# Patient Record
Sex: MALE | Race: WHITE | NOT HISPANIC OR LATINO | Employment: OTHER | URBAN - METROPOLITAN AREA
[De-identification: names, ages, dates, MRNs, and addresses within clinical notes are randomized per-mention and may not be internally consistent; named-entity substitution may affect disease eponyms.]

---

## 2018-01-11 ENCOUNTER — ALLSCRIPTS OFFICE VISIT (OUTPATIENT)
Dept: OTHER | Facility: OTHER | Age: 83
End: 2018-01-11

## 2018-01-11 LAB
BILIRUB UR QL STRIP: NORMAL
CLARITY UR: NORMAL
COLOR UR: YELLOW
GLUCOSE (HISTORICAL): NORMAL
HGB UR QL STRIP.AUTO: NORMAL
KETONES UR STRIP-MCNC: NORMAL MG/DL
LEUKOCYTE ESTERASE UR QL STRIP: NORMAL
NITRITE UR QL STRIP: NORMAL
PH UR STRIP.AUTO: 7.5 [PH]
PROT UR STRIP-MCNC: NORMAL MG/DL
SP GR UR STRIP.AUTO: 1.01
UROBILINOGEN UR QL STRIP.AUTO: 0.2

## 2018-01-22 VITALS
DIASTOLIC BLOOD PRESSURE: 70 MMHG | WEIGHT: 186 LBS | HEIGHT: 69 IN | SYSTOLIC BLOOD PRESSURE: 130 MMHG | BODY MASS INDEX: 27.55 KG/M2

## 2018-08-07 DIAGNOSIS — N13.9 BENIGN LOCALIZED HYPERPLASIA OF PROSTATE WITH URINARY OBSTRUCTION AND LOWER URINARY TRACT SYMPTOMS: Primary | ICD-10-CM

## 2018-08-07 DIAGNOSIS — N40.1 BENIGN LOCALIZED HYPERPLASIA OF PROSTATE WITH URINARY OBSTRUCTION AND LOWER URINARY TRACT SYMPTOMS: Primary | ICD-10-CM

## 2018-08-07 RX ORDER — TAMSULOSIN HYDROCHLORIDE 0.4 MG/1
0.4 CAPSULE ORAL
Qty: 90 CAPSULE | Refills: 1 | Status: SHIPPED | OUTPATIENT
Start: 2018-08-07 | End: 2019-01-10 | Stop reason: SDUPTHER

## 2018-08-07 NOTE — TELEPHONE ENCOUNTER
An Auto-fax Refill Request for Tamsulosin was received from [facility]  Patient was last seen in January, 2018 by YAMILA Larkin; continuation of the medication was approved at that time    Script for same, 90 day supply with 1 refills was queued and forwarded to Dr Giancarlo Wong for approval

## 2019-01-08 RX ORDER — FUROSEMIDE 40 MG/1
40 TABLET ORAL
COMMUNITY

## 2019-01-08 RX ORDER — POTASSIUM CHLORIDE 20 MEQ/1
20 TABLET, EXTENDED RELEASE ORAL
COMMUNITY

## 2019-01-08 RX ORDER — FAMOTIDINE 40 MG/1
40 TABLET, FILM COATED ORAL
COMMUNITY

## 2019-01-08 RX ORDER — TAMSULOSIN HYDROCHLORIDE 0.4 MG/1
1 CAPSULE ORAL
COMMUNITY
Start: 2018-01-11 | End: 2019-01-10 | Stop reason: SDUPTHER

## 2019-01-08 RX ORDER — SIMVASTATIN 40 MG
40 TABLET ORAL
COMMUNITY

## 2019-01-10 ENCOUNTER — OFFICE VISIT (OUTPATIENT)
Dept: UROLOGY | Facility: MEDICAL CENTER | Age: 84
End: 2019-01-10
Payer: MEDICARE

## 2019-01-10 VITALS
BODY MASS INDEX: 24.62 KG/M2 | HEIGHT: 70 IN | WEIGHT: 172 LBS | HEART RATE: 75 BPM | SYSTOLIC BLOOD PRESSURE: 120 MMHG | DIASTOLIC BLOOD PRESSURE: 60 MMHG

## 2019-01-10 DIAGNOSIS — N13.9 BENIGN LOCALIZED HYPERPLASIA OF PROSTATE WITH URINARY OBSTRUCTION AND LOWER URINARY TRACT SYMPTOMS: Primary | ICD-10-CM

## 2019-01-10 DIAGNOSIS — N40.1 BENIGN LOCALIZED HYPERPLASIA OF PROSTATE WITH URINARY OBSTRUCTION AND LOWER URINARY TRACT SYMPTOMS: Primary | ICD-10-CM

## 2019-01-10 LAB
SL AMB  POCT GLUCOSE, UA: NORMAL
SL AMB LEUKOCYTE ESTERASE,UA: NORMAL
SL AMB POCT BILIRUBIN,UA: NORMAL
SL AMB POCT BLOOD,UA: NORMAL
SL AMB POCT CLARITY,UA: CLEAR
SL AMB POCT COLOR,UA: YELLOW
SL AMB POCT KETONES,UA: NORMAL
SL AMB POCT NITRITE,UA: NORMAL
SL AMB POCT PH,UA: 7
SL AMB POCT SPECIFIC GRAVITY,UA: 1.02
SL AMB POCT URINE PROTEIN: NORMAL
SL AMB POCT UROBILINOGEN: 0.2

## 2019-01-10 PROCEDURE — 81003 URINALYSIS AUTO W/O SCOPE: CPT | Performed by: UROLOGY

## 2019-01-10 PROCEDURE — 99214 OFFICE O/P EST MOD 30 MIN: CPT | Performed by: UROLOGY

## 2019-01-10 RX ORDER — LOSARTAN POTASSIUM 100 MG/1
100 TABLET ORAL DAILY
COMMUNITY

## 2019-01-10 RX ORDER — TAMSULOSIN HYDROCHLORIDE 0.4 MG/1
0.4 CAPSULE ORAL
Qty: 30 CAPSULE | Refills: 11 | Status: SHIPPED | OUTPATIENT
Start: 2019-01-10 | End: 2020-01-08 | Stop reason: SDUPTHER

## 2019-01-10 RX ORDER — TAMSULOSIN HYDROCHLORIDE 0.4 MG/1
0.4 CAPSULE ORAL
Qty: 90 CAPSULE | Refills: 3 | Status: SHIPPED | OUTPATIENT
Start: 2019-01-10 | End: 2019-01-10 | Stop reason: SDUPTHER

## 2019-01-10 NOTE — PROGRESS NOTES
[address] [facility]  [facility]  [address] [facility]  [place], [address] [facility]  [phone]  www  [facility]  org      NAME: Luli Parks  AGE: 81 y o  SEX: male  : 1935   MRN: [id number]    DATE: 1/10/2019  TIME: 1:45 PM    Assessment and Plan:    BPH with obstruction- continue with flomax  Large left hydrocele: This does not bother him, so does not warrant treatment  Encounter for prostate cancer screening:  Normal rectal exam, PSA was stable for him back in April  We can check his PSA every couple of years  Follow-up 1 year  Chief Complaint   No chief complaint on file  History of Present Illness   81-year-old man for follow-up of BPH with obstruction, and history of nodular prostate status post prostate needle biopsy in   He continues on Flomax  CT scan 2018 showed normal urinary tract  No new complaints  PSA 2 59 2018, basically stable  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[employer]       The following portions of the patient's history were reviewed and updated as appropriate: allergies, current medications, past family history, past medical history, past social history, past surgical history and problem list     Review of Systems   Review of Systems   Genitourinary: Negative  Active Problem List   There is no problem list on file for this patient  Objective   /60   Pulse 75   Ht 5' 10" (1 778 m)   Wt 78 kg (172 lb)   BMI 24 68 kg/m²     Physical Exam   Constitutional: He is oriented to person, place, and time  He appears well-developed and well-nourished  HENT:   Head: Normocephalic and atraumatic  Eyes: EOM are normal    Neck: Normal range of motion  Pulmonary/Chest: Effort normal    Abdominal: Soft  He exhibits no distension  There is no tenderness  There is no rebound and no guarding     Genitourinary: Penis normal    Genitourinary Comments: Rectal exam reveals normal tone, no masses and his prostate is about 50 g, smooth symmetric and benign  No nodules  His testicles are descended bilaterally but he has a very large left hydrocele  No inguinal hernia  Musculoskeletal: Normal range of motion  Neurological: He is alert and oriented to person, place, and time  Skin: Skin is warm and dry  Psychiatric: He has a normal mood and affect   His behavior is normal  Judgment and thought content normal            Current Medications     Current Outpatient Prescriptions:     Cholecalciferol (CVS VITAMIN D3) 1000 units capsule, Take by mouth, Disp: , Rfl:     famotidine (PEPCID) 40 MG tablet, Take 40 mg by mouth, Disp: , Rfl:     furosemide (LASIX) 40 mg tablet, Take 40 mg by mouth, Disp: , Rfl:     losartan (COZAAR) 100 MG tablet, Take 100 mg by mouth daily, Disp: , Rfl:     potassium chloride (K-DUR,KLOR-CON) 20 mEq tablet, Take 20 mEq by mouth, Disp: , Rfl:     simvastatin (ZOCOR) 40 mg tablet, Take 40 mg by mouth, Disp: , Rfl:     tamsulosin (FLOMAX) 0 4 mg, Take 1 capsule (0 4 mg total) by mouth daily with dinner, Disp: 90 capsule, Rfl: 1    fluticasone (VERAMYST) 27 5 MCG/SPRAY nasal spray, into each nostril, Disp: , Rfl:         Jaime Lombardo MD

## 2019-01-10 NOTE — LETTER
January 10, 2019     Felicitas Deutsch MD  [address] [address] [employer]    Patient: Sheila Fajardo   YOB: 1935   Date of Visit: 1/10/2019       Dear Dr Summer Medina: Thank you for referring Tal Colindres to me for evaluation  Below are my notes for this consultation  If you have questions, please do not hesitate to call me  I look forward to following your patient along with you  Sincerely,        Sherryle Alice, MD        CC: No Recipients  Sherryle Alice, MD  1/10/2019  2:00 PM  Sign at close encounter  [address] [facility]  [address], [address] [place] [address]  [phone]  www  [facility]  org      NAME: Sheila Fajardo  AGE: 81 y o  SEX: male  : 1935   MRN: [id number]    DATE: 1/10/2019  TIME: 1:45 PM    Assessment and Plan:    BPH with obstruction- continue with flomax  Large left hydrocele: This does not bother him, so does not warrant treatment  Encounter for prostate cancer screening:  Normal rectal exam, PSA was stable for him back in April  We can check his PSA every couple of years  Follow-up 1 year  Chief Complaint   No chief complaint on file  History of Present Illness   81-year-old man for follow-up of BPH with obstruction, and history of nodular prostate status post prostate needle biopsy in   He continues on Flomax  CT scan 2018 showed normal urinary tract  No new complaints  PSA 2 59 2018, basically stable  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[employer]       The following portions of the patient's history were reviewed and updated as appropriate: allergies, current medications, past family history, past medical history, past social history, past surgical history and problem list     Review of Systems   Review of Systems   Genitourinary: Negative          Active Problem List   There is no problem list on file for this patient  Objective   /60   Pulse 75   Ht 5' 10" (1 778 m)   Wt 78 kg (172 lb)   BMI 24 68 kg/m²      Physical Exam   Constitutional: He is oriented to person, place, and time  He appears well-developed and well-nourished  HENT:   Head: Normocephalic and atraumatic  Eyes: EOM are normal    Neck: Normal range of motion  Pulmonary/Chest: Effort normal    Abdominal: Soft  He exhibits no distension  There is no tenderness  There is no rebound and no guarding  Genitourinary: Penis normal    Genitourinary Comments: Rectal exam reveals normal tone, no masses and his prostate is about 50 g, smooth symmetric and benign  No nodules  His testicles are descended bilaterally but he has a very large left hydrocele  No inguinal hernia  Musculoskeletal: Normal range of motion  Neurological: He is alert and oriented to person, place, and time  Skin: Skin is warm and dry  Psychiatric: He has a normal mood and affect   His behavior is normal  Judgment and thought content normal            Current Medications     Current Outpatient Prescriptions:     Cholecalciferol (CVS VITAMIN D3) 1000 units capsule, Take by mouth, Disp: , Rfl:     famotidine (PEPCID) 40 MG tablet, Take 40 mg by mouth, Disp: , Rfl:     furosemide (LASIX) 40 mg tablet, Take 40 mg by mouth, Disp: , Rfl:     losartan (COZAAR) 100 MG tablet, Take 100 mg by mouth daily, Disp: , Rfl:     potassium chloride (K-DUR,KLOR-CON) 20 mEq tablet, Take 20 mEq by mouth, Disp: , Rfl:     simvastatin (ZOCOR) 40 mg tablet, Take 40 mg by mouth, Disp: , Rfl:     tamsulosin (FLOMAX) 0 4 mg, Take 1 capsule (0 4 mg total) by mouth daily with dinner, Disp: 90 capsule, Rfl: 1    fluticasone (VERAMYST) 27 5 MCG/SPRAY nasal spray, into each nostril, Disp: , Rfl:         Sherryle Alice, MD

## 2019-03-20 ENCOUNTER — HOSPITAL ENCOUNTER (EMERGENCY)
Facility: HOSPITAL | Age: 84
Discharge: HOME | End: 2019-03-20
Attending: EMERGENCY MEDICINE | Admitting: EMERGENCY MEDICINE
Payer: MEDICARE

## 2019-03-20 ENCOUNTER — APPOINTMENT (EMERGENCY)
Dept: CT IMAGING | Facility: HOSPITAL | Age: 84
End: 2019-03-20
Payer: MEDICARE

## 2019-03-20 VITALS
RESPIRATION RATE: 16 BRPM | OXYGEN SATURATION: 97 % | SYSTOLIC BLOOD PRESSURE: 170 MMHG | HEART RATE: 76 BPM | DIASTOLIC BLOOD PRESSURE: 77 MMHG | TEMPERATURE: 98.2 F

## 2019-03-20 DIAGNOSIS — S09.90XA INJURY OF HEAD, INITIAL ENCOUNTER: Primary | ICD-10-CM
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
## 2019-03-20 DIAGNOSIS — S00.83XA TRAUMATIC HEMATOMA OF FOREHEAD, INITIAL ENCOUNTER: ICD-10-CM

## 2019-03-20 PROCEDURE — 70450 CT HEAD/BRAIN W/O DYE: CPT

## 2019-03-20 PROCEDURE — 99284 EMERGENCY DEPT VISIT MOD MDM: CPT

## 2019-03-20 NOTE — DISCHARGE INSTRUCTIONS
DISCHARGE INSTRUCTIONS:    FOLLOW UP WITH YOUR PRIMARY CARE PROVIDER OR THE [address] [place] [address]  MAKE AN APPOINTMENT TO BE SEEN  TAKE TYLENOL FOR PAIN  REST AND ICE THE AREA  IF SYMPTOMS WORSEN OR NEW SYMPTOMS ARISE, RETURN TO THE ER TO BE SEEN

## 2019-03-20 NOTE — ED PROVIDER NOTES
History  Chief Complaint   Patient presents with    Head Injury     Patient tripped on the last step walking into the [employer], fell and struck his right forehead off of the [employer] floor; (-) LOC/thinners; AAOx4      83y  o male with PMH of BPH, gout, GERD, HLD and HTN presents to the ER for evaluation after a fall occurring 1 hour prior to arrival  Patient states he was at [employer] going up steep steps when he tripped over the last step  He did hit his head but denies LOC  He denies blood thinner use  He applied ice to his head  He describes his pain as aching and non-radiating  Pain comes and goes  He denies fever, chills, vision changes, chest pain, dyspnea, N/V/D, abdominal pain, neck pain, back pain, weakness or paresthesias  History provided by:  Patient   used: No        Prior to Admission Medications   Prescriptions Last Dose Informant Patient Reported? Taking?    Cholecalciferol (CVS VITAMIN D3) 1000 units capsule  Self Yes No   Sig: Take by mouth   famotidine (PEPCID) 40 MG tablet  Self Yes No   Sig: Take 40 mg by mouth   fluticasone (VERAMYST) 27 5 MCG/SPRAY nasal spray  Self Yes No   Sig: into each nostril   furosemide (LASIX) 40 mg tablet  Self Yes No   Sig: Take 40 mg by mouth   losartan (COZAAR) 100 MG tablet  Self Yes No   Sig: Take 100 mg by mouth daily   potassium chloride (K-DUR,KLOR-CON) 20 mEq tablet  Self Yes No   Sig: Take 20 mEq by mouth   simvastatin (ZOCOR) 40 mg tablet  Self Yes No   Sig: Take 40 mg by mouth   tamsulosin (FLOMAX) 0 4 mg   No No   Sig: Take 1 capsule (0 4 mg total) by mouth daily with dinner      Facility-Administered Medications: None       Past Medical History:   Diagnosis Date    Allergic rhinitis     BPH with obstruction/lower urinary tract symptoms 2016    Eye disorder     Frequency of urination 2015    Gout     H/O gastroesophageal reflux (GERD)     Hypercholesteremia     Hypertension     Incomplete bladder emptying 2016    UTI (urinary tract infection) 2015       Past Surgical History:   Procedure Laterality Date    CATARACT EXTRACTION, BILATERAL      COLONOSCOPY  2002    TONSILLECTOMY  1942       Family History   Problem Relation Age of Onset    Heart failure Mother     Heart attack Father     Hypertension Father      I have reviewed and agree with the history as documented  Social History     Tobacco Use    Smoking status: Never Smoker    Smokeless tobacco: Never Used   Substance Use Topics    Alcohol use: Yes     Alcohol/week: 1 2 oz     Types: 2 Glasses of wine per week    Drug use: No        Review of Systems   Constitutional: Negative for chills and fever  Eyes: Negative for redness  Respiratory: Negative for shortness of breath  Cardiovascular: Negative for chest pain  Gastrointestinal: Negative for abdominal pain, diarrhea, nausea and vomiting  Musculoskeletal: Negative for back pain, neck pain and neck stiffness  Skin: Negative for rash  Allergic/Immunologic: Negative for food allergies  Neurological: Negative for weakness and numbness  Physical Exam  Physical Exam   Constitutional:  Non-toxic appearance  No distress  HENT:   Head: Normocephalic  Head is with contusion  Head is without raccoon's eyes, without Olson's sign and without laceration  Right Ear: Tympanic membrane, external ear and ear canal normal  No drainage, swelling or tenderness  No foreign bodies  Tympanic membrane is not erythematous  No hemotympanum  Left Ear: Tympanic membrane, external ear and ear canal normal  No drainage, swelling or tenderness  No foreign bodies  Tympanic membrane is not erythematous  No hemotympanum  Nose: Nose normal    Mouth/Throat: Uvula is midline, oropharynx is clear and moist and mucous membranes are normal  No uvula swelling  No posterior oropharyngeal edema, posterior oropharyngeal erythema or tonsillar abscesses  No tonsillar exudate  Eyes: Pupils are equal, round, and reactive to light  Conjunctivae and EOM are normal    Neck: Normal range of motion and phonation normal  Neck supple  No tracheal deviation present  Cardiovascular: Normal rate, regular rhythm, S1 normal, S2 normal and normal heart sounds  Exam reveals no gallop and no friction rub  No murmur heard  Pulmonary/Chest: Effort normal and breath sounds normal  No respiratory distress  He has no decreased breath sounds  He has no wheezes  He has no rhonchi  He has no rales  He exhibits no tenderness  Abdominal: Soft  Bowel sounds are normal  He exhibits no distension  There is no tenderness  There is no rebound and no guarding  Musculoskeletal: Normal range of motion  He exhibits no edema or deformity  Cervical back: Normal         Thoracic back: Normal         Lumbar back: Normal         Right hand: He exhibits laceration (abrasions seen)  He exhibits normal range of motion, no tenderness, no bony tenderness, normal capillary refill, no deformity and no swelling  Normal sensation noted  Normal strength noted  Hands:  Neurological: He is alert  He has normal strength  No cranial nerve deficit or sensory deficit  He exhibits normal muscle tone  Gait normal  GCS eye subscore is 4  GCS verbal subscore is 5  GCS motor subscore is 6  Skin: Skin is warm and dry  No rash noted  Psychiatric: He has a normal mood and affect  Nursing note and vitals reviewed        Vital Signs  ED Triage Vitals [03/20/19 1149]   Temperature Pulse Respirations Blood Pressure SpO2   98 4 °F (36 9 °C) 89 18 (!) 174/77 99 %      Temp Source Heart Rate Source Patient Position - Orthostatic VS BP Location FiO2 (%)   Oral Monitor Lying Right arm --      Pain Score       7           Vitals:    03/20/19 1149   BP: (!) 174/77   Pulse: 89   Patient Position - Orthostatic VS: Lying         Visual Acuity  Visual Acuity      Most Recent Value   L Pupil Size (mm)  2   R Pupil Size (mm)  2          ED Medications  Medications - No data to display    Diagnostic Studies  Results Reviewed     None                 CT head without contrast   Final Result by Darby Pickett DO (03/20 1228)   No acute intracranial abnormality  Small right forehead scalp hematoma without underlying fracture  Workstation performed: [id number]                    Procedures  Procedures       Phone Contacts  ED Phone Contact    ED Course                               MDM  Number of Diagnoses or Management Options  Fall, initial encounter: new and requires workup  Injury of head, initial encounter: new and requires workup  Traumatic hematoma of forehead, initial encounter: new and requires workup  Diagnosis management comments: DDX consists of but not limited to: fracture, contusion    Will CT head  At discharge, I instructed the patient to:  -follow up with pcp  -take Tylenol for pain  -rest and apply ice to the area  -return to the ER if symptoms worsened or new symptoms arose  Patient agreed to this plan and was stable at time of discharge  Amount and/or Complexity of Data Reviewed  Tests in the radiology section of CPT®: ordered and reviewed  Independent visualization of images, tracings, or specimens: yes    Patient Progress  Patient progress: stable      Disposition  Final diagnoses:   Injury of head, initial encounter   Fall, initial encounter   Traumatic hematoma of forehead, initial encounter     Time reflects when diagnosis was documented in both MDM as applicable and the Disposition within this note     Time User Action Codes Description Comment    3/20/2019  1:06 PM Karla Mccabe Add [S09 90XA] Injury of head, initial encounter     3/20/2019  1:06 PM Karla Mccabe Add [X44  MWII] Fall, initial encounter     3/20/2019  1:07 PM Celena VELASQUEZ Add [S00 83XA] Traumatic hematoma of forehead, initial encounter       ED Disposition     ED Disposition Condition Date/Time Comment    Discharge Stable Wed Mar 20, 2019  1:06 PM Arron Allen Carolyn Mandujano discharge to home/self care  Follow-up Information     Follow up With Specialties Details Why Contact Info    Nirali Lott MD Geriatric Medicine Schedule an appointment as soon as possible for a visit  As needed [address]            Patient's Medications   Discharge Prescriptions    No medications on file     No discharge procedures on file      ED Provider  Electronically Signed by           Yola Moreno PA-C  03/20/19 1101

## 2019-03-20 NOTE — ED NOTES
Kendrick Ibarra,  from [facility], to bring patient's car for him and pick him up        Brice Parra, RN  03/20/19 1327

## 2019-03-20 NOTE — ED NOTES
Patient transported to [address] [place] [address], [address] [facility]  03/20/19 1212

## 2020-01-07 DIAGNOSIS — N40.1 BENIGN LOCALIZED HYPERPLASIA OF PROSTATE WITH URINARY OBSTRUCTION AND LOWER URINARY TRACT SYMPTOMS: ICD-10-CM

## 2020-01-07 DIAGNOSIS — N13.9 BENIGN LOCALIZED HYPERPLASIA OF PROSTATE WITH URINARY OBSTRUCTION AND LOWER URINARY TRACT SYMPTOMS: ICD-10-CM

## 2020-01-08 NOTE — TELEPHONE ENCOUNTER
The patient has an upcoming office visit scheduled for 2/11/20 with Dr Qiana Pastor in the [facility] location but will run out of medication until then    Request for same, 90 day supply with NO refills was queued and forwarded to the Advanced Practitioner covering the [facility] location for approval

## 2020-01-09 RX ORDER — TAMSULOSIN HYDROCHLORIDE 0.4 MG/1
0.4 CAPSULE ORAL
Qty: 90 CAPSULE | Refills: 0 | Status: SHIPPED | OUTPATIENT
Start: 2020-01-09 | End: 2020-04-02 | Stop reason: SDUPTHER

## 2020-02-11 ENCOUNTER — OFFICE VISIT (OUTPATIENT)
Dept: UROLOGY | Facility: MEDICAL CENTER | Age: 85
End: 2020-02-11
Payer: MEDICARE

## 2020-02-11 VITALS
WEIGHT: 181 LBS | SYSTOLIC BLOOD PRESSURE: 140 MMHG | HEIGHT: 70 IN | DIASTOLIC BLOOD PRESSURE: 70 MMHG | HEART RATE: 80 BPM | BODY MASS INDEX: 25.91 KG/M2

## 2020-02-11 DIAGNOSIS — N13.9 BENIGN LOCALIZED HYPERPLASIA OF PROSTATE WITH URINARY OBSTRUCTION AND LOWER URINARY TRACT SYMPTOMS: Primary | ICD-10-CM

## 2020-02-11 DIAGNOSIS — Z12.5 ENCOUNTER FOR PROSTATE CANCER SCREENING: ICD-10-CM

## 2020-02-11 DIAGNOSIS — N40.1 BENIGN LOCALIZED HYPERPLASIA OF PROSTATE WITH URINARY OBSTRUCTION AND LOWER URINARY TRACT SYMPTOMS: Primary | ICD-10-CM

## 2020-02-11 LAB
SL AMB  POCT GLUCOSE, UA: NORMAL
SL AMB LEUKOCYTE ESTERASE,UA: NORMAL
SL AMB POCT BILIRUBIN,UA: NORMAL
SL AMB POCT BLOOD,UA: NORMAL
SL AMB POCT CLARITY,UA: CLEAR
SL AMB POCT COLOR,UA: YELLOW
SL AMB POCT KETONES,UA: NORMAL
SL AMB POCT NITRITE,UA: NORMAL
SL AMB POCT PH,UA: 7
SL AMB POCT SPECIFIC GRAVITY,UA: 1.01
SL AMB POCT URINE PROTEIN: NORMAL
SL AMB POCT UROBILINOGEN: 0.2

## 2020-02-11 PROCEDURE — 81003 URINALYSIS AUTO W/O SCOPE: CPT | Performed by: UROLOGY

## 2020-02-11 PROCEDURE — 99213 OFFICE O/P EST LOW 20 MIN: CPT | Performed by: UROLOGY

## 2020-02-11 RX ORDER — LORATADINE 10 MG/1
10 TABLET ORAL DAILY
COMMUNITY

## 2020-02-11 RX ORDER — COLCHICINE 0.6 MG/1
0.6 TABLET ORAL DAILY
COMMUNITY

## 2020-02-11 NOTE — PROGRESS NOTES
[address] [facility]  [facility]  [address] [facility]  [place], [address] [facility]  [phone]  www  [facility]  org      NAME: Paul Montalvo  AGE: 81 y o  SEX: male  : 1935   MRN: [id number]    DATE: 2020  TIME: 2:01 PM    Assessment and Plan :    BPH with obstruction, continue with Flomax doing well on this  Encounter for prostate cancer screening:  Check PSA and send him the results  Follow-up in 2 years  We will refill his prescriptions upon request     Left hydrocele: No treatment needed  Chief Complaint   No chief complaint on file  History of Present Illness   Follow-up BPH with obstruction-has been on Flomax  No nocturia, no gross hematuria and he is satisfied with the way he urinates  Large left hydrocele under observation which has not bothered him in the past   No change  Encounter for prostate cancer screening:  Had normal rectal exam last year  Checking his PSA every couple of years  Nothing recent  Last 1 was 2 59 2018  The following portions of the patient's history were reviewed and updated as appropriate: allergies, current medications, past family history, past medical history, past social history, past surgical history and problem list   Past Medical History:   Diagnosis Date    Allergic rhinitis     BPH with obstruction/lower urinary tract symptoms 2016    Eye disorder     Frequency of urination     Gout     H/O gastroesophageal reflux (GERD)     Hypercholesteremia     Hypertension     Incomplete bladder emptying 2016    UTI (urinary tract infection)      Past Surgical History:   Procedure Laterality Date    CATARACT EXTRACTION, BILATERAL      COLONOSCOPY      TONSILLECTOMY  194     shoulder  Review of Systems   Review of Systems   Genitourinary: Negative  Active Problem List   There is no problem list on file for this patient        Objective   /70 Pulse 80   Ht 5' 10" (1 778 m)   Wt 82 1 kg (181 lb)   BMI 25 97 kg/m²     Physical Exam   Constitutional: He is oriented to person, place, and time  He appears well-developed and well-nourished  HENT:   Head: Normocephalic and atraumatic  Eyes: EOM are normal    Neck: Normal range of motion  Pulmonary/Chest: Effort normal    Musculoskeletal: Normal range of motion  Neurological: He is alert and oriented to person, place, and time  Skin: Skin is warm and dry  Psychiatric: He has a normal mood and affect   His behavior is normal  Judgment and thought content normal            Current Medications     Current Outpatient Medications:     Cholecalciferol (CVS VITAMIN D3) 1000 units capsule, Take by mouth, Disp: , Rfl:     colchicine (COLCRYS) 0 6 mg tablet, Take 0 6 mg by mouth daily, Disp: , Rfl:     famotidine (PEPCID) 40 MG tablet, Take 40 mg by mouth, Disp: , Rfl:     furosemide (LASIX) 40 mg tablet, Take 40 mg by mouth, Disp: , Rfl:     loratadine (CLARITIN) 10 mg tablet, Take 10 mg by mouth daily, Disp: , Rfl:     losartan (COZAAR) 100 MG tablet, Take 100 mg by mouth daily, Disp: , Rfl:     potassium chloride (K-DUR,KLOR-CON) 20 mEq tablet, Take 20 mEq by mouth, Disp: , Rfl:     simvastatin (ZOCOR) 40 mg tablet, Take 40 mg by mouth, Disp: , Rfl:     tamsulosin (FLOMAX) 0 4 mg, Take 1 capsule (0 4 mg total) by mouth daily with dinner, Disp: 90 capsule, Rfl: 0    fluticasone (VERAMYST) 27 5 MCG/SPRAY nasal spray, into each nostril, Disp: , Rfl:         Cecily Crow MD

## 2020-03-13 ENCOUNTER — TELEPHONE (OUTPATIENT)
Dept: UROLOGY | Facility: CLINIC | Age: 85
End: 2020-03-13

## 2020-03-13 NOTE — TELEPHONE ENCOUNTER
Called patient phone continually rang could not leave a message      ----- Message from Aditya Reed MD sent at 3/13/2020  1:44 PM EDT -----  Please inform patient that the results are normal

## 2020-04-01 DIAGNOSIS — N13.9 BENIGN LOCALIZED HYPERPLASIA OF PROSTATE WITH URINARY OBSTRUCTION AND LOWER URINARY TRACT SYMPTOMS: ICD-10-CM

## 2020-04-01 DIAGNOSIS — N40.1 BENIGN LOCALIZED HYPERPLASIA OF PROSTATE WITH URINARY OBSTRUCTION AND LOWER URINARY TRACT SYMPTOMS: ICD-10-CM

## 2020-04-02 RX ORDER — TAMSULOSIN HYDROCHLORIDE 0.4 MG/1
0.4 CAPSULE ORAL
Qty: 90 CAPSULE | Refills: 3 | Status: SHIPPED | OUTPATIENT
Start: 2020-04-02 | End: 2020-12-30

## 2020-12-30 DIAGNOSIS — N40.1 BENIGN LOCALIZED HYPERPLASIA OF PROSTATE WITH URINARY OBSTRUCTION AND LOWER URINARY TRACT SYMPTOMS: ICD-10-CM

## 2020-12-30 DIAGNOSIS — N13.9 BENIGN LOCALIZED HYPERPLASIA OF PROSTATE WITH URINARY OBSTRUCTION AND LOWER URINARY TRACT SYMPTOMS: ICD-10-CM

## 2020-12-30 RX ORDER — TAMSULOSIN HYDROCHLORIDE 0.4 MG/1
CAPSULE ORAL
Qty: 90 CAPSULE | Refills: 2 | Status: SHIPPED | OUTPATIENT
Start: 2020-12-30 | End: 2022-03-24 | Stop reason: SDUPTHER

## 2021-07-06 ENCOUNTER — HOSPITAL ENCOUNTER (EMERGENCY)
Facility: HOSPITAL | Age: 86
Discharge: HOME | End: 2021-07-06
Attending: EMERGENCY MEDICINE | Admitting: EMERGENCY MEDICINE
Payer: MEDICARE

## 2021-07-06 VITALS
DIASTOLIC BLOOD PRESSURE: 71 MMHG | OXYGEN SATURATION: 98 % | SYSTOLIC BLOOD PRESSURE: 155 MMHG | HEART RATE: 88 BPM | RESPIRATION RATE: 16 BRPM | TEMPERATURE: 98.3 F

## 2021-07-06 DIAGNOSIS — K62.89 RECTAL PAIN: Primary | ICD-10-CM

## 2021-07-06 DIAGNOSIS — K64.9 HEMORRHOIDS: ICD-10-CM

## 2021-07-06 PROCEDURE — 99283 EMERGENCY DEPT VISIT LOW MDM: CPT

## 2021-07-06 PROCEDURE — 99284 EMERGENCY DEPT VISIT MOD MDM: CPT | Performed by: EMERGENCY MEDICINE

## 2021-07-06 RX ORDER — DOCUSATE SODIUM 100 MG/1
100 CAPSULE, LIQUID FILLED ORAL EVERY 12 HOURS
Qty: 60 CAPSULE | Refills: 0 | Status: SHIPPED | OUTPATIENT
Start: 2021-07-06

## 2021-07-06 RX ORDER — LIDOCAINE HYDROCHLORIDE 20 MG/ML
JELLY TOPICAL ONCE
Status: COMPLETED | OUTPATIENT
Start: 2021-07-06 | End: 2021-07-06

## 2021-07-06 RX ORDER — ACETAMINOPHEN 325 MG/1
650 TABLET ORAL ONCE
Status: COMPLETED | OUTPATIENT
Start: 2021-07-06 | End: 2021-07-06

## 2021-07-06 RX ADMIN — LIDOCAINE HYDROCHLORIDE: 20 JELLY TOPICAL at 12:52

## 2021-07-06 RX ADMIN — ACETAMINOPHEN 650 MG: 325 TABLET, FILM COATED ORAL at 12:51

## 2021-07-06 NOTE — ED NOTES
Pt requested RN to call [facility] and provide update  RN spoke with Nate Mireles1 Zainab Lorenzo RN  07/06/21 5686

## 2021-07-06 NOTE — ED NOTES
RN spoke with MOY to set up transportation home  Waiting for time        Rochelle Kirby RN  07/06/21 1539

## 2021-07-06 NOTE — ED PROVIDER NOTES
History  Chief Complaint   Patient presents with    Rectal Pain     Pt reporting rectal pain after being constipated and having 2 bowl movements in a row yesterday  64-year-old male presents for evaluation of rectal pain x1 day  Patient was a sharp pain in his rectum the slightly around the surrounding area that started gradually today after having a hard bowel movement for not moving his bowels secondary constipation  Pain is constant, moderate severity, nonradiating, without modifying factors  No rectal bleeding or melena, nausea vomiting fevers, chills, abdominal pain, back/flank pain  History provided by:  Patient      Prior to Admission Medications   Prescriptions Last Dose Informant Patient Reported? Taking?    Cholecalciferol ([employer] VITAMIN D3) 1000 units capsule  Self Yes No   Sig: Take by mouth   colchicine (COLCRYS) 0 6 mg tablet   Yes No   Sig: Take 0 6 mg by mouth daily   famotidine (PEPCID) 40 MG tablet  Self Yes No   Sig: Take 40 mg by mouth   fluticasone (VERAMYST) 27 5 MCG/SPRAY nasal spray  Self Yes No   Sig: into each nostril   furosemide (LASIX) 40 mg tablet  Self Yes No   Sig: Take 40 mg by mouth   loratadine (CLARITIN) 10 mg tablet   Yes No   Sig: Take 10 mg by mouth daily   losartan (COZAAR) 100 MG tablet  Self Yes No   Sig: Take 100 mg by mouth daily   potassium chloride (K-DUR,KLOR-CON) 20 mEq tablet  Self Yes No   Sig: Take 20 mEq by mouth   simvastatin (ZOCOR) 40 mg tablet  Self Yes No   Sig: Take 40 mg by mouth   tamsulosin (FLOMAX) 0 4 mg   No No   Si CAP BY MOUTH EVERY EVENING  **SWALLOW WHOLE-DO NOT CHEW OR CRUSH*      Facility-Administered Medications: None       Past Medical History:   Diagnosis Date    Allergic rhinitis     BPH with obstruction/lower urinary tract symptoms 2016    Eye disorder     Frequency of urination     Gout     H/O gastroesophageal reflux (GERD)     Hypercholesteremia     Hypertension     Incomplete bladder emptying 2016    UTI (urinary tract infection) 2015       Past Surgical History:   Procedure Laterality Date    CATARACT EXTRACTION, BILATERAL      COLONOSCOPY  2002    TONSILLECTOMY  1942       Family History   Problem Relation Age of Onset    Heart failure Mother     Heart attack Father     Hypertension Father      I have reviewed and agree with the history as documented  E-Cigarette/Vaping     E-Cigarette/Vaping Substances     Social History     Tobacco Use    Smoking status: Never Smoker    Smokeless tobacco: Never Used   Substance Use Topics    Alcohol use: Yes     Alcohol/week: 2 0 standard drinks     Types: 2 Glasses of wine per week    Drug use: No       Review of Systems   Constitutional: Negative for chills and fever  HENT: Negative for ear pain and sore throat  Eyes: Negative for pain and visual disturbance  Respiratory: Negative for cough and shortness of breath  Cardiovascular: Negative for chest pain and palpitations  Gastrointestinal: Positive for constipation and rectal pain  Negative for abdominal pain and vomiting  Genitourinary: Negative for dysuria and hematuria  Musculoskeletal: Negative for arthralgias and back pain  Skin: Negative for color change and rash  Neurological: Negative for seizures and syncope  All other systems reviewed and are negative  Physical Exam  Physical Exam  Constitutional:       Appearance: He is well-developed  HENT:      Head: Normocephalic and atraumatic  Eyes:      General: No scleral icterus  Conjunctiva/sclera: Conjunctivae normal    Neck:      Vascular: No JVD  Trachea: No tracheal deviation  Pulmonary:      Effort: Pulmonary effort is normal  No respiratory distress  Abdominal:      General: There is no distension  Tenderness: There is no abdominal tenderness  There is no right CVA tenderness or guarding     Genitourinary:     Comments: Star present as chaperone, pt with irritation around the rectum, small nonthrombosed external hemorrhoid  Musculoskeletal:         General: No deformity  Normal range of motion  Cervical back: Normal range of motion  Skin:     Coloration: Skin is not pale  Findings: No erythema or rash  Neurological:      Mental Status: He is alert and oriented to person, place, and time  Psychiatric:         Behavior: Behavior normal          Vital Signs  ED Triage Vitals   Temperature Pulse Respirations Blood Pressure SpO2   07/06/21 1104 07/06/21 1104 07/06/21 1104 07/06/21 1104 07/06/21 1104   98 3 °F (36 8 °C) 94 16 170/67 98 %      Temp Source Heart Rate Source Patient Position - Orthostatic VS BP Location FiO2 (%)   07/06/21 1104 07/06/21 1104 07/06/21 1104 07/06/21 1104 --   Oral Monitor Sitting Right arm       Pain Score       07/06/21 1105       7           Vitals:    07/06/21 1104   BP: 170/67   Pulse: 94   Patient Position - Orthostatic VS: Sitting         Visual Acuity      ED Medications  Medications - No data to display    Diagnostic Studies  Results Reviewed     None                 No orders to display              Procedures  Procedures         ED Course                             SBIRT 22yo+      Most Recent Value   SBIRT (23 yo +)   In order to provide better care to our patients, we are screening all of our patients for alcohol and drug use  Would it be okay to ask you these screening questions?   No Filed at: 07/06/2021 1105                    [facility]  Number of Diagnoses or Management Options  Hemorrhoids  Rectal pain  Diagnosis management comments: Rectal pain with irritation around the rectum and a questionable small external hemorrhoid without evidence of thrombosis or infection-will treat with Proctofoam, stool softeners      Disposition  Final diagnoses:   Rectal pain   Hemorrhoids     Time reflects when diagnosis was documented in both MDM as applicable and the Disposition within this note     Time User Action Codes Description Comment    7/6/2021 11:37 AM Alyx Miller Spikes Add [K62 89] Rectal pain     7/6/2021 11:37 AM Morgan Fitzgerald [K64 9] Hemorrhoids       ED Disposition     ED Disposition Condition Date/Time Comment    Discharge Stable Tue Jul 6, 2021 11:37 AM Harithadominguez MenaKyle discharge to home/self care  Follow-up Information     Follow up With Specialties Details Why Contact Info    Popeye Alexandre MD Geriatric Medicine Schedule an appointment as soon as possible for a visit in 2 days  [address] [place] [address]  [phone]            Patient's Medications   Discharge Prescriptions    DOCUSATE SODIUM (COLACE) 100 MG CAPSULE    Take 1 capsule (100 mg total) by mouth every 12 (twelve) hours       Start Date: 7/6/2021  End Date: --       Order Dose: 100 mg       Quantity: 60 capsule    Refills: 0    HYDROCORTISONE-PRAMOXINE (PROCTOFOAM-HC) 1-1 % FOAM RECTAL FOAM    Insert 1 applicator into the rectum 2 (two) times a day       Start Date: 7/6/2021  End Date: --       Order Dose: 1 applicator       Quantity: 10 g    Refills: 0     No discharge procedures on file      PDMP Review     None          ED Provider  Electronically Signed by           Caesar Monique MD  07/06/21 0600

## 2022-03-24 ENCOUNTER — OFFICE VISIT (OUTPATIENT)
Dept: UROLOGY | Facility: MEDICAL CENTER | Age: 87
End: 2022-03-24
Payer: MEDICARE

## 2022-03-24 VITALS
SYSTOLIC BLOOD PRESSURE: 130 MMHG | HEART RATE: 93 BPM | HEIGHT: 70 IN | BODY MASS INDEX: 25.77 KG/M2 | DIASTOLIC BLOOD PRESSURE: 70 MMHG | WEIGHT: 180 LBS

## 2022-03-24 DIAGNOSIS — N13.9 BENIGN LOCALIZED HYPERPLASIA OF PROSTATE WITH URINARY OBSTRUCTION AND LOWER URINARY TRACT SYMPTOMS: Primary | ICD-10-CM

## 2022-03-24 DIAGNOSIS — N40.1 BENIGN LOCALIZED HYPERPLASIA OF PROSTATE WITH URINARY OBSTRUCTION AND LOWER URINARY TRACT SYMPTOMS: Primary | ICD-10-CM

## 2022-03-24 DIAGNOSIS — Z12.5 PROSTATE CANCER SCREENING: ICD-10-CM

## 2022-03-24 PROCEDURE — 99213 OFFICE O/P EST LOW 20 MIN: CPT | Performed by: NURSE PRACTITIONER

## 2022-03-24 PROCEDURE — 81003 URINALYSIS AUTO W/O SCOPE: CPT | Performed by: NURSE PRACTITIONER

## 2022-03-24 RX ORDER — TAMSULOSIN HYDROCHLORIDE 0.4 MG/1
0.4 CAPSULE ORAL
Qty: 90 CAPSULE | Refills: 3 | Status: SHIPPED | OUTPATIENT
Start: 2022-03-24

## 2022-03-24 NOTE — PROGRESS NOTES
3/24/2022      Assessment and Plan    81 y o  male managed by Dr Emmie Elizondo    1  Benign Prostatic Hyperplasia  · Urine dip in office unremakable   · Continue Tamsulosin   · Will continue to monitor for worsening/progression of symptoms  · Follow up in the office in 2 years    2  Prostate Cancer Screening  · PSA performed 2/24/2022 resulted 1 28  · KYLE- prostate 45-50 g with no nodules   · Repeat PSA and KYLE in 2 year      History of Present Illness  Karissa Rosa is a 81 y o  male here for follow up evaluation of urinary symptoms secondary benign prostatic hyperplasia  Patient has been previously managed with tamsulosin with good control all lower urinary tract symptoms  On evaluation the office today he denies complaints of urinary frequency and urgency  Reports getting up 1-2 times at night to urinate  Reports sensation of complete bladder emptying with urination  He denies changes to his general health since prior office evaluation  PSA trend below  Component      PSA, Total   PSA, Free   PSA, % Free     Component 02/24/2022 03/10/2020          PSA, Total 1 28 1 80 Load older lab results   PSA, Free -- -- Load older lab results   PSA, % Free -- --        Review of Systems   Constitutional: Negative for chills and fever  Respiratory: Negative for cough and shortness of breath  Cardiovascular: Negative for chest pain  Gastrointestinal: Negative for abdominal distention, abdominal pain, blood in stool, nausea and vomiting  Genitourinary: Negative for difficulty urinating, dysuria, enuresis, flank pain, frequency, hematuria and urgency  Skin: Negative for rash  AUA SYMPTOM SCORE      Most Recent Value   AUA SYMPTOM SCORE    How often have you had a sensation of not emptying your bladder completely after you finished urinating? 1   How often have you had to urinate again less than two hours after you finished urinating?  1   How often have you found you stopped and started again several times when you urinate? 1   How often have you found it difficult to postpone urination? 0   How often have you had a weak urinary stream? 0   How often have you had to push or strain to begin urination? 0   How many times did you most typically get up to urinate from the time you went to bed at night until the time you got up in the morning? 1   Quality of Life: If you were to spend the rest of your life with your urinary condition just the way it is now, how would you feel about that? 2   AUA SYMPTOM SCORE 4             Past Medical History  Past Medical History:   Diagnosis Date    Allergic rhinitis     BPH with obstruction/lower urinary tract symptoms 2016    Eye disorder     Frequency of urination 2015    Gout     H/O gastroesophageal reflux (GERD)     Hypercholesteremia     Hypertension     Incomplete bladder emptying 2016    UTI (urinary tract infection) 2015       Past Social History  Past Surgical History:   Procedure Laterality Date    CATARACT EXTRACTION, BILATERAL      COLONOSCOPY  2002    TONSILLECTOMY  1942     Social History     Tobacco Use   Smoking Status Never Smoker   Smokeless Tobacco Never Used       Past Family History  Family History   Problem Relation Age of Onset    Heart failure Mother     Heart attack Father     Hypertension Father        Past Social history  Social History     Socioeconomic History    Marital status: Single     Spouse name: Not on file    Number of children: Not on file    Years of education: Not on file    Highest education level: Not on file   Occupational History    Not on file   Tobacco Use    Smoking status: Never Smoker    Smokeless tobacco: Never Used   Substance and Sexual Activity    Alcohol use:  Yes     Alcohol/week: 2 0 standard drinks     Types: 2 Glasses of wine per week    Drug use: No    Sexual activity: Not on file   Other Topics Concern    Not on file   Social History Narrative    Not on file     Social Determinants of Health     Financial Resource Strain: Not on file   Food Insecurity: Not on file   Transportation Needs: Not on file   Physical Activity: Not on file   Stress: Not on file   Social Connections: Not on file   Intimate Partner Violence: Not on file   Housing Stability: Not on file       Current Medications  Current Outpatient Medications   Medication Sig Dispense Refill    Cholecalciferol ([employer] VITAMIN D3) 1000 units capsule Take by mouth      colchicine (COLCRYS) 0 6 mg tablet Take 0 6 mg by mouth daily      famotidine (PEPCID) 40 MG tablet Take 40 mg by mouth      fluticasone (VERAMYST) 27 5 MCG/SPRAY nasal spray into each nostril      furosemide (LASIX) 40 mg tablet Take 40 mg by mouth      loratadine (CLARITIN) 10 mg tablet Take 10 mg by mouth daily      losartan (COZAAR) 100 MG tablet Take 100 mg by mouth daily      potassium chloride (K-DUR,KLOR-CON) 20 mEq tablet Take 20 mEq by mouth      simvastatin (ZOCOR) 40 mg tablet Take 40 mg by mouth      tamsulosin (FLOMAX) 0 4 mg Take 1 capsule (0 4 mg total) by mouth daily with dinner 90 capsule 3    docusate sodium (COLACE) 100 mg capsule Take 1 capsule (100 mg total) by mouth every 12 (twelve) hours 60 capsule 0    hydrocortisone-pramoxine (PROCTOFOAM-HC) 1-1 % FOAM rectal foam Insert 1 applicator into the rectum 2 (two) times a day 10 g 0     No current facility-administered medications for this visit  Allergies  Allergies   Allergen Reactions    Penicillins Other (See Comments)     Pt is unsure if allergic  The following portions of the patient's history were reviewed and updated as appropriate: allergies, current medications, past medical history, past social history, past surgical history and problem list       Vitals  Vitals:    03/24/22 1351   BP: 130/70   Pulse: 93   Weight: 81 6 kg (180 lb)   Height: 5' 10" (1 778 m)           Physical Exam  Physical Exam  Vitals reviewed     Constitutional:       General: He is not in acute distress  Appearance: Normal appearance  He is normal weight  HENT:      Head: Normocephalic  Pulmonary:      Effort: No respiratory distress  Breath sounds: Normal breath sounds  Genitourinary:     Comments: KYLE-prostate 45-50 g with no nodules  Skin:     General: Skin is warm and dry  Neurological:      General: No focal deficit present  Mental Status: He is alert and oriented to person, place, and time  Psychiatric:         Mood and Affect: Mood normal          Behavior: Behavior normal            Results  No results found for this or any previous visit (from the past 1 hour(s))  ]  No results found for: PSA  No results found for: GLUCOSE, CALCIUM, NA, K, CO2, CL, BUN, CREATININE  No results found for: WBC, HGB, HCT, MCV, PLT        Orders  Orders Placed This Encounter   Procedures    PSA, Total Screen     This is a patient instruction: This test is non-fasting  Please drink two glasses of water morning of bloodwork          Standing Status:   Future     Standing Expiration Date:   9/24/2023    POCT urine dip auto non-scope       YAMILA Jones

## 2022-10-16 ENCOUNTER — APPOINTMENT (EMERGENCY)
Dept: RADIOLOGY | Facility: HOSPITAL | Age: 87
End: 2022-10-16
Payer: MEDICARE

## 2022-10-16 ENCOUNTER — HOSPITAL ENCOUNTER (EMERGENCY)
Facility: HOSPITAL | Age: 87
Discharge: HOME | End: 2022-10-16
Attending: EMERGENCY MEDICINE
Payer: MEDICARE

## 2022-10-16 VITALS
SYSTOLIC BLOOD PRESSURE: 152 MMHG | WEIGHT: 180 LBS | OXYGEN SATURATION: 97 % | HEART RATE: 58 BPM | HEIGHT: 70 IN | RESPIRATION RATE: 16 BRPM | BODY MASS INDEX: 25.77 KG/M2 | DIASTOLIC BLOOD PRESSURE: 68 MMHG | TEMPERATURE: 97.4 F

## 2022-10-16 DIAGNOSIS — U07.1 COVID-19: Primary | ICD-10-CM

## 2022-10-16 LAB
ALBUMIN SERPL BCP-MCNC: 3.4 G/DL (ref 3.5–5)
ALP SERPL-CCNC: 73 U/L (ref 46–116)
ALT SERPL W P-5'-P-CCNC: 20 U/L (ref 12–78)
ANION GAP SERPL CALCULATED.3IONS-SCNC: 6 MMOL/L (ref 4–13)
AST SERPL W P-5'-P-CCNC: 14 U/L (ref 5–45)
BASOPHILS # BLD AUTO: 0.02 THOUSANDS/ÂΜL (ref 0–0.1)
BASOPHILS NFR BLD AUTO: 1 % (ref 0–1)
BILIRUB SERPL-MCNC: 0.39 MG/DL (ref 0.2–1)
BUN SERPL-MCNC: 24 MG/DL (ref 5–25)
CALCIUM ALBUM COR SERPL-MCNC: 9.9 MG/DL (ref 8.3–10.1)
CALCIUM SERPL-MCNC: 9.4 MG/DL (ref 8.3–10.1)
CHLORIDE SERPL-SCNC: 103 MMOL/L (ref 96–108)
CO2 SERPL-SCNC: 30 MMOL/L (ref 21–32)
CREAT SERPL-MCNC: 1.45 MG/DL (ref 0.6–1.3)
EOSINOPHIL # BLD AUTO: 0.03 THOUSAND/ÂΜL (ref 0–0.61)
EOSINOPHIL NFR BLD AUTO: 1 % (ref 0–6)
ERYTHROCYTE [DISTWIDTH] IN BLOOD BY AUTOMATED COUNT: 13.4 % (ref 11.6–15.1)
GFR SERPL CREATININE-BSD FRML MDRD: 43 ML/MIN/1.73SQ M
GLUCOSE SERPL-MCNC: 120 MG/DL (ref 65–140)
HCT VFR BLD AUTO: 36.5 % (ref 36.5–49.3)
HGB BLD-MCNC: 11.7 G/DL (ref 12–17)
IMM GRANULOCYTES # BLD AUTO: 0.01 THOUSAND/UL (ref 0–0.2)
IMM GRANULOCYTES NFR BLD AUTO: 0 % (ref 0–2)
LYMPHOCYTES # BLD AUTO: 1.25 THOUSANDS/ÂΜL (ref 0.6–4.47)
LYMPHOCYTES NFR BLD AUTO: 34 % (ref 14–44)
MCH RBC QN AUTO: 31.5 PG (ref 26.8–34.3)
MCHC RBC AUTO-ENTMCNC: 32.1 G/DL (ref 31.4–37.4)
MCV RBC AUTO: 98 FL (ref 82–98)
MONOCYTES # BLD AUTO: 0.29 THOUSAND/ÂΜL (ref 0.17–1.22)
MONOCYTES NFR BLD AUTO: 8 % (ref 4–12)
NEUTROPHILS # BLD AUTO: 2.1 THOUSANDS/ÂΜL (ref 1.85–7.62)
NEUTS SEG NFR BLD AUTO: 56 % (ref 43–75)
NRBC BLD AUTO-RTO: 0 /100 WBCS
PLATELET # BLD AUTO: 200 THOUSANDS/UL (ref 149–390)
PMV BLD AUTO: 9.8 FL (ref 8.9–12.7)
POTASSIUM SERPL-SCNC: 4.1 MMOL/L (ref 3.5–5.3)
PROT SERPL-MCNC: 7.9 G/DL (ref 6.4–8.4)
RBC # BLD AUTO: 3.71 MILLION/UL (ref 3.88–5.62)
SODIUM SERPL-SCNC: 139 MMOL/L (ref 135–147)
WBC # BLD AUTO: 3.7 THOUSAND/UL (ref 4.31–10.16)

## 2022-10-16 PROCEDURE — 85025 COMPLETE CBC W/AUTO DIFF WBC: CPT | Performed by: PHYSICIAN ASSISTANT

## 2022-10-16 PROCEDURE — 80053 COMPREHEN METABOLIC PANEL: CPT | Performed by: PHYSICIAN ASSISTANT

## 2022-10-16 PROCEDURE — 71045 X-RAY EXAM CHEST 1 VIEW: CPT

## 2022-10-16 PROCEDURE — 99284 EMERGENCY DEPT VISIT MOD MDM: CPT

## 2022-10-16 PROCEDURE — 36415 COLL VENOUS BLD VENIPUNCTURE: CPT | Performed by: PHYSICIAN ASSISTANT

## 2022-10-16 PROCEDURE — 99285 EMERGENCY DEPT VISIT HI MDM: CPT | Performed by: PHYSICIAN ASSISTANT

## 2022-10-16 RX ORDER — MELATONIN
2000 DAILY
Qty: 28 TABLET | Refills: 0 | Status: SHIPPED | OUTPATIENT
Start: 2022-10-16 | End: 2022-10-30

## 2022-10-16 RX ORDER — BUDESONIDE 180 UG/1
4 AEROSOL, POWDER RESPIRATORY (INHALATION) 2 TIMES DAILY
Qty: 1 EACH | Refills: 0 | Status: SHIPPED | OUTPATIENT
Start: 2022-10-16 | End: 2022-10-23

## 2022-10-16 RX ORDER — NIRMATRELVIR AND RITONAVIR 150-100 MG
2 KIT ORAL 2 TIMES DAILY
Qty: 20 TABLET | Refills: 0 | Status: SHIPPED | OUTPATIENT
Start: 2022-10-16 | End: 2022-10-21

## 2022-10-16 NOTE — ED PROVIDER NOTES
History  Chief Complaint   Patient presents with   • COVID-19 Exposure     Pt arrived via EMS  Pt states that he took an at home COVID test that came out positive which made him slightly anxious and wanted to come in for an evaluation  Pt denies cp but states he is slightly sob since last night  Ayesha Mock is a 81 y o   male with PMH of hypertension, hyperlipidemia, and BPH who presents to the emergency department with concerns about COVID  Patient states over last 2-3 days he has had intermittent sneezing as well as a dry nonproductive cough  States that he tested for COVID last evening secondary to these symptoms and was positive  Patient states he became extremely anxious about his COVID-19 diagnosis so came to the emergency department for evaluation  He currently denies any other associated complaints  He states his only symptoms or COVID are the seizing in the cough  He is vaccinated x4, most recent in May  He currently denies any other symptoms  He denies any fevers, headache, dizziness, lightheadedness, chest pain, shortness of breath, palpitations, abdominal pain, nausea, vomiting, diarrhea  He denies any urinary complaints or rashes  He denies any dyspnea on exertion or leg swelling                History provided by:  Patient   used: No    Medical Problem  Location:  COVID-19 concern  Severity:  Mild  Onset quality:  Gradual  Duration:  3 days  Timing:  Constant  Progression:  Unchanged  Chronicity:  New  Context:  After sneezing and dry nonproductive cough  Relieved by:  Nothing  Worsened by:  Nothing  Ineffective treatments:  None tried  Associated symptoms: cough and rhinorrhea    Associated symptoms: no abdominal pain, no chest pain, no congestion, no diarrhea, no ear pain, no fatigue, no fever, no headaches, no loss of consciousness, no myalgias, no nausea, no rash, no shortness of breath, no sore throat, no vomiting and no wheezing    Cough:     Cough characteristics:  Non-productive    Sputum characteristics:  Nondescript    Severity:  Mild    Onset quality:  Gradual  Rhinorrhea:     Quality:  Clear    Severity:  Mild      Prior to Admission Medications   Prescriptions Last Dose Informant Patient Reported? Taking?    Cholecalciferol ([employer] VITAMIN D3) 1000 units capsule  Self Yes No   Sig: Take by mouth   colchicine (COLCRYS) 0 6 mg tablet   Yes No   Sig: Take 0 6 mg by mouth daily   docusate sodium (COLACE) 100 mg capsule   No No   Sig: Take 1 capsule (100 mg total) by mouth every 12 (twelve) hours   famotidine (PEPCID) 40 MG tablet  Self Yes No   Sig: Take 40 mg by mouth   fluticasone (VERAMYST) 27 5 MCG/SPRAY nasal spray  Self Yes No   Sig: into each nostril   furosemide (LASIX) 40 mg tablet  Self Yes No   Sig: Take 40 mg by mouth   hydrocortisone-pramoxine (PROCTOFOAM-HC) 1-1 % FOAM rectal foam   No No   Sig: Insert 1 applicator into the rectum 2 (two) times a day   loratadine (CLARITIN) 10 mg tablet   Yes No   Sig: Take 10 mg by mouth daily   losartan (COZAAR) 100 MG tablet  Self Yes No   Sig: Take 100 mg by mouth daily   potassium chloride (K-DUR,KLOR-CON) 20 mEq tablet  Self Yes No   Sig: Take 20 mEq by mouth   simvastatin (ZOCOR) 40 mg tablet  Self Yes No   Sig: Take 40 mg by mouth   tamsulosin (FLOMAX) 0 4 mg   No No   Sig: Take 1 capsule (0 4 mg total) by mouth daily with dinner      Facility-Administered Medications: None       Past Medical History:   Diagnosis Date   • Allergic rhinitis    • BPH with obstruction/lower urinary tract symptoms 2016   • Eye disorder    • Frequency of urination 2015   • Gout    • H/O gastroesophageal reflux (GERD)    • Hypercholesteremia    • Hypertension    • Incomplete bladder emptying 2016   • UTI (urinary tract infection) 2015       Past Surgical History:   Procedure Laterality Date   • CATARACT EXTRACTION, BILATERAL     • COLONOSCOPY  2002   • TONSILLECTOMY  1942       Family History   Problem Relation Age of Onset   • Heart failure Mother    • Heart attack Father    • Hypertension Father      I have reviewed and agree with the history as documented  E-Cigarette/Vaping     E-Cigarette/Vaping Substances     Social History     Tobacco Use   • Smoking status: Never Smoker   • Smokeless tobacco: Never Used   Substance Use Topics   • Alcohol use: Yes     Alcohol/week: 2 0 standard drinks     Types: 2 Glasses of wine per week   • Drug use: No       Review of Systems   Constitutional: Negative for activity change, appetite change, chills, fatigue, fever and unexpected weight change  HENT: Positive for rhinorrhea  Negative for congestion, ear discharge, ear pain, postnasal drip, sinus pressure, sinus pain and sore throat  Respiratory: Positive for cough  Negative for apnea, choking, chest tightness, shortness of breath, wheezing and stridor  Cardiovascular: Negative for chest pain, palpitations and leg swelling  Gastrointestinal: Negative for abdominal pain, blood in stool, constipation, diarrhea, nausea and vomiting  Genitourinary: Negative for dysuria, flank pain, frequency and urgency  Musculoskeletal: Negative for arthralgias, myalgias and neck stiffness  Skin: Negative for color change, pallor, rash and wound  Neurological: Negative for dizziness, tremors, seizures, loss of consciousness, syncope, light-headedness, numbness and headaches  All other systems reviewed and are negative  Physical Exam  Physical Exam  Vitals and nursing note reviewed  Constitutional:       General: He is not in acute distress  Appearance: Normal appearance  He is normal weight  He is not ill-appearing or toxic-appearing  HENT:      Head: Normocephalic and atraumatic  Nose: Nose normal       Mouth/Throat:      Mouth: Mucous membranes are moist       Pharynx: Oropharynx is clear  No oropharyngeal exudate  Eyes:      Pupils: Pupils are equal, round, and reactive to light     Cardiovascular:      Rate and Rhythm: Normal rate and regular rhythm  Pulses: Normal pulses  Heart sounds: Normal heart sounds  No murmur heard  No friction rub  No gallop  Pulmonary:      Effort: Pulmonary effort is normal  No respiratory distress  Breath sounds: Normal breath sounds  No stridor  No wheezing, rhonchi or rales  Abdominal:      General: Abdomen is flat  Bowel sounds are normal  There is no distension  Palpations: Abdomen is soft  There is no mass  Tenderness: There is no abdominal tenderness  There is no guarding or rebound  Hernia: No hernia is present  Musculoskeletal:         General: No swelling, tenderness or deformity  Normal range of motion  Cervical back: Normal range of motion  No rigidity or tenderness  Skin:     General: Skin is warm and dry  Capillary Refill: Capillary refill takes less than 2 seconds  Neurological:      General: No focal deficit present  Mental Status: He is alert and oriented to person, place, and time  Mental status is at baseline  Cranial Nerves: No cranial nerve deficit  Sensory: No sensory deficit  Motor: No weakness           Vital Signs  ED Triage Vitals [10/16/22 0346]   Temperature Pulse Respirations Blood Pressure SpO2   (!) 97 4 °F (36 3 °C) 88 20 163/75 96 %      Temp Source Heart Rate Source Patient Position - Orthostatic VS BP Location FiO2 (%)   Oral Monitor Lying Right arm --      Pain Score       --           Vitals:    10/16/22 0346 10/16/22 0500   BP: 163/75 152/68   Pulse: 88 58   Patient Position - Orthostatic VS: Lying Lying         Visual Acuity      ED Medications  Medications - No data to display    Diagnostic Studies  Results Reviewed     Procedure Component Value Units Date/Time    Comprehensive metabolic panel [[id number]]  (Abnormal) Collected: 10/16/22 0420    Lab Status: Final result Specimen: Blood from Arm, Left Updated: 10/16/22 0445     Sodium 139 mmol/L      Potassium 4 1 mmol/L      Chloride 103 mmol/L CO2 30 mmol/L      ANION GAP 6 mmol/L      BUN 24 mg/dL      Creatinine 1 45 mg/dL      Glucose 120 mg/dL      Calcium 9 4 mg/dL      Corrected Calcium 9 9 mg/dL      AST 14 U/L      ALT 20 U/L      Alkaline Phosphatase 73 U/L      Total Protein 7 9 g/dL      Albumin 3 4 g/dL      Total Bilirubin 0 39 mg/dL      eGFR 43 ml/min/1 73sq m     Narrative:      National Kidney Disease Foundation guidelines for Chronic Kidney Disease (CKD):   •  Stage 1 with normal or high GFR (GFR > 90 mL/min/1 73 square meters)  •  Stage 2 Mild CKD (GFR = 60-89 mL/min/1 73 square meters)  •  Stage 3A Moderate CKD (GFR = 45-59 mL/min/1 73 square meters)  •  Stage 3B Moderate CKD (GFR = 30-44 mL/min/1 73 square meters)  •  Stage 4 Severe CKD (GFR = 15-29 mL/min/1 73 square meters)  •  Stage 5 End Stage CKD (GFR <15 mL/min/1 73 square meters)  Note: GFR calculation is accurate only with a steady state creatinine    CBC and differential [[id number]]  (Abnormal) Collected: 10/16/22 0420    Lab Status: Final result Specimen: Blood from Arm, Left Updated: 10/16/22 0427     WBC 3 70 Thousand/uL      RBC 3 71 Million/uL      Hemoglobin 11 7 g/dL      Hematocrit 36 5 %      MCV 98 fL      MCH 31 5 pg      MCHC 32 1 g/dL      RDW 13 4 %      MPV 9 8 fL      Platelets 691 Thousands/uL      nRBC 0 /100 WBCs      Neutrophils Relative 56 %      Immat GRANS % 0 %      Lymphocytes Relative 34 %      Monocytes Relative 8 %      Eosinophils Relative 1 %      Basophils Relative 1 %      Neutrophils Absolute 2 10 Thousands/µL      Immature Grans Absolute 0 01 Thousand/uL      Lymphocytes Absolute 1 25 Thousands/µL      Monocytes Absolute 0 29 Thousand/µL      Eosinophils Absolute 0 03 Thousand/µL      Basophils Absolute 0 02 Thousands/µL                  XR chest 1 view portable   ED Interpretation by Blanca Bailon PA-C (10/16 7918)   Right-sided patchiness, consistent with COVID pneumonia                 Procedures  Procedures         ED Course SBIRT 20yo+    Flowsheet Row Most Recent Value   SBIRT (23 yo +)    In order to provide better care to our patients, we are screening all of our patients for alcohol and drug use  Would it be okay to ask you these screening questions? No Filed at: 10/16/2022 0349                    MDM  Number of Diagnoses or Management Options  COVID-19: new and requires workup  Diagnosis management comments: Patient was seen and examined  in the emergency department for chief complaint of concerns about COVID-19  The patient presented after self testing for COVID after sneezing and dry nonproductive cough  COVID test was positive in patient became anxious about it  He denies any other systemic complaints  On exam patient is in no acute distress  Neuro exam is nonfocal   Lungs are clear  Abdomen soft nontender nondistended  No rashes  He ambulatory pulse ox 95% with ambulation  Does not become tachycardic or subjectively/objectively dyspneic on exertion  Initial differential includes but is not limited to:  COVID-19, viral syndrome, URI, pneumonia, renal dysfunction, electrolyte abnormalities      Workup: Will check basic labs as well as chest x-ray  Patient does not meet criteria for admission  Patient is well-appearing not dyspneic and not hypoxic  Patient is requesting Paxlovid- we discussed he cannot take colchicine we discussed to discontinue his statin for 10 days  We discussed budesonide inhaler per [facility] protocol  We discussed vitamin-D  Patient expressed understanding  We discussed return symptoms  He we discussed need for follow-up with primary care      Disposition:  General impression a 81 year male COVID-19  Minor symptoms  Follow-up primary care  Discussed Paxlovid another symptomatic care  Return precautions and follow-up discussed      The patient was discharged in stable condition  Patient ambulated off the department    Extensive return to emergency department precautions were discussed  Follow up with appropriate providers including primary care physician was discussed  Patient and/or their  primary decision maker expressed understanding  Patient remained stable during entire emergency department stay  Amount and/or Complexity of Data Reviewed  Clinical lab tests: ordered and reviewed  Tests in the radiology section of CPT®: ordered and reviewed  Review and summarize past medical records: yes  Independent visualization of images, tracings, or specimens: yes    Risk of Complications, Morbidity, and/or Mortality  Presenting problems: low  Diagnostic procedures: low  Management options: low    Patient Progress  Patient progress: stable      Disposition  Final diagnoses:   COVID-19     Time reflects when diagnosis was documented in both MDM as applicable and the Disposition within this note     Time User Action Codes Description Comment    10/16/2022  5:28 AM Pipe Fitzgerald [U07 1] COVID-19       ED Disposition     ED Disposition   Discharge    Condition   Stable    Date/Time   Sun Oct 16, 2022  5:29 AM    Comment   Jesusa Kawasaki discharge to home/self care                 Follow-up Information     Follow up With Specialties Details Why Contact Info Additional [address] [facility] Emergency Department Emergency Medicine Go to  As needed, If symptoms worsen [facility] [id number]  [address] [place] [address] Emergency Department, [address] [place] [address] 200  Call  To schedule an appointment with a primary care physician [phone]       [facility] [employer], DO  Schedule an appointment as soon as possible for a visit  For follow-up of COVID-19 [address]  [phone]             Discharge Medication List as of 10/16/2022  5:31 AM      START taking these medications    Details   budesonide (Pulmicort Flexhaler) 180 MCG/ACT inhaler Inhale 4 puffs 2 (two) times a day for 7 days Rinse mouth after use , Starting Sun 10/16/2022, Until Sun 10/23/2022, Normal      cholecalciferol (VITAMIN D3) 1,000 units tablet Take 2 tablets (2,000 Units total) by mouth daily for 14 days, Starting Sun 10/16/2022, Until Sun 10/30/2022, Print      nirmatrelvir & ritonavir (Paxlovid, 150/100,) tablet therapy pack Take 2 tablets by mouth 2 (two) times a day for 5 days Take 1 nirmatrelvir tablet + 1 ritonavir tablet together per dose  Stop zocor for 10 days  Do not take colchicine, Starting Sun 10/16/2022, Until Fri 10/21/2022, Print         CONTINUE these medications which have NOT CHANGED    Details   Cholecalciferol ([employer] VITAMIN D3) 1000 units capsule Take by mouth, Historical Med      colchicine (COLCRYS) 0 6 mg tablet Take 0 6 mg by mouth daily, Historical Med      docusate sodium (COLACE) 100 mg capsule Take 1 capsule (100 mg total) by mouth every 12 (twelve) hours, Starting Tue 7/6/2021, Normal      famotidine (PEPCID) 40 MG tablet Take 40 mg by mouth, Historical Med      fluticasone (VERAMYST) 27 5 MCG/SPRAY nasal spray into each nostril, Historical Med      furosemide (LASIX) 40 mg tablet Take 40 mg by mouth, Historical Med      hydrocortisone-pramoxine (PROCTOFOAM-HC) 1-1 % FOAM rectal foam Insert 1 applicator into the rectum 2 (two) times a day, Starting Tue 7/6/2021, Normal      loratadine (CLARITIN) 10 mg tablet Take 10 mg by mouth daily, Historical Med      losartan (COZAAR) 100 MG tablet Take 100 mg by mouth daily, Historical Med      potassium chloride (K-DUR,KLOR-CON) 20 mEq tablet Take 20 mEq by mouth, Historical Med      simvastatin (ZOCOR) 40 mg tablet Take 40 mg by mouth, Historical Med      tamsulosin (FLOMAX) 0 4 mg Take 1 capsule (0 4 mg total) by mouth daily with dinner, Starting u 3/24/2022, Normal             No discharge procedures on file      PDMP Review     None          ED Provider  Electronically Signed by           Ivania Taylor Audie Merlin, PA-C  10/16/22 0735

## 2022-10-16 NOTE — DISCHARGE INSTRUCTIONS
You were seen in the emergency department today for COVID-19  Laboratory analysis and Radiologic imaging did not reveal any acute abnormality  Please follow-up with your primary care physician regarding your symptoms  Please follow-up with your primary care physician within 24 hours regarding your symptoms for further management of COVID-19 as well as recommendations for outpatient therapy  Please take vitamin D3 2000IU daily  Budesonide 180 mcg- please take 4 puffs twice a day for 14 days  Please utilized pulse oximeter twice daily, and please return to emergency department pulse oximeter reads <90%    Please quarantine for 10 days after diagnosis  Please return to the emergency department with any worsening symptoms including but not limited to: fevers, dizziness, chest pain, shortness of breath, palpitations, loss of consciousness, abdominal pain, nausea, vomiting, inability to eat or drink, diarrhea, or lower extremity changes  Plenty of fluids and rest   Tylenol for fevers and muscle aches      Stop  Zocor for 10 days and do not take colchicine while on Paxlovid

## 2023-03-27 ENCOUNTER — APPOINTMENT (EMERGENCY)
Dept: RADIOLOGY | Facility: HOSPITAL | Age: 88
End: 2023-03-27

## 2023-03-27 ENCOUNTER — APPOINTMENT (EMERGENCY)
Dept: CT IMAGING | Facility: HOSPITAL | Age: 88
End: 2023-03-27

## 2023-03-27 ENCOUNTER — HOSPITAL ENCOUNTER (INPATIENT)
Facility: HOSPITAL | Age: 88
LOS: 3 days | Discharge: HOME | End: 2023-03-30
Attending: EMERGENCY MEDICINE | Admitting: INTERNAL MEDICINE

## 2023-03-27 DIAGNOSIS — E53.8 B12 DEFICIENCY: ICD-10-CM

## 2023-03-27 DIAGNOSIS — R29.90 STROKE-LIKE SYMPTOM: ICD-10-CM

## 2023-03-27 DIAGNOSIS — R31.9 HEMATURIA: Primary | ICD-10-CM

## 2023-03-27 DIAGNOSIS — R26.2 AMBULATORY DYSFUNCTION: ICD-10-CM

## 2023-03-27 DIAGNOSIS — R26.9 NEUROLOGIC GAIT DYSFUNCTION: ICD-10-CM

## 2023-03-27 DIAGNOSIS — R29.898 LEG WEAKNESS: ICD-10-CM

## 2023-03-27 LAB
2HR DELTA HS TROPONIN: 3 NG/L
ALBUMIN SERPL BCP-MCNC: 4.2 G/DL (ref 3.5–5)
ALP SERPL-CCNC: 65 U/L (ref 34–104)
ALT SERPL W P-5'-P-CCNC: 9 U/L (ref 7–52)
ANION GAP SERPL CALCULATED.3IONS-SCNC: 7 MMOL/L (ref 4–13)
AST SERPL W P-5'-P-CCNC: 11 U/L (ref 13–39)
BACTERIA UR QL AUTO: NORMAL /HPF
BASOPHILS # BLD AUTO: 0.03 THOUSANDS/ÂΜL (ref 0–0.1)
BASOPHILS NFR BLD AUTO: 0 % (ref 0–1)
BILIRUB DIRECT SERPL-MCNC: 0.04 MG/DL (ref 0–0.2)
BILIRUB SERPL-MCNC: 0.55 MG/DL (ref 0.2–1)
BILIRUB UR QL STRIP: NEGATIVE
BUN SERPL-MCNC: 20 MG/DL (ref 5–25)
CALCIUM SERPL-MCNC: 9.8 MG/DL (ref 8.4–10.2)
CARDIAC TROPONIN I PNL SERPL HS: 13 NG/L
CARDIAC TROPONIN I PNL SERPL HS: 16 NG/L
CHLORIDE SERPL-SCNC: 103 MMOL/L (ref 96–108)
CLARITY UR: CLEAR
CO2 SERPL-SCNC: 31 MMOL/L (ref 21–32)
COLOR UR: YELLOW
COLOR, POC: YELLOW
CREAT SERPL-MCNC: 1.36 MG/DL (ref 0.6–1.3)
EOSINOPHIL # BLD AUTO: 0 THOUSAND/ÂΜL (ref 0–0.61)
EOSINOPHIL NFR BLD AUTO: 0 % (ref 0–6)
ERYTHROCYTE [DISTWIDTH] IN BLOOD BY AUTOMATED COUNT: 13.2 % (ref 11.6–15.1)
EXT LEUKOCYTE EST: ABNORMAL
FLUAV RNA RESP QL NAA+PROBE: NEGATIVE
FLUBV RNA RESP QL NAA+PROBE: NEGATIVE
GFR SERPL CREATININE-BSD FRML MDRD: 46 ML/MIN/1.73SQ M
GLUCOSE SERPL-MCNC: 116 MG/DL (ref 65–140)
GLUCOSE UR STRIP-MCNC: NEGATIVE MG/DL
HCT VFR BLD AUTO: 37 % (ref 36.5–49.3)
HGB BLD-MCNC: 12.1 G/DL (ref 12–17)
HGB UR QL STRIP.AUTO: NEGATIVE
IMM GRANULOCYTES # BLD AUTO: 0.02 THOUSAND/UL (ref 0–0.2)
IMM GRANULOCYTES NFR BLD AUTO: 0 % (ref 0–2)
KETONES UR STRIP-MCNC: NEGATIVE MG/DL
LEUKOCYTE ESTERASE UR QL STRIP: ABNORMAL
LYMPHOCYTES # BLD AUTO: 1.02 THOUSANDS/ÂΜL (ref 0.6–4.47)
LYMPHOCYTES NFR BLD AUTO: 14 % (ref 14–44)
MCH RBC QN AUTO: 32.4 PG (ref 26.8–34.3)
MCHC RBC AUTO-ENTMCNC: 32.7 G/DL (ref 31.4–37.4)
MCV RBC AUTO: 99 FL (ref 82–98)
MONOCYTES # BLD AUTO: 0.36 THOUSAND/ÂΜL (ref 0.17–1.22)
MONOCYTES NFR BLD AUTO: 5 % (ref 4–12)
NEUTROPHILS # BLD AUTO: 5.82 THOUSANDS/ÂΜL (ref 1.85–7.62)
NEUTS SEG NFR BLD AUTO: 81 % (ref 43–75)
NITRITE UR QL STRIP: NEGATIVE
NON-SQ EPI CELLS URNS QL MICRO: NORMAL /HPF
NRBC BLD AUTO-RTO: 0 /100 WBCS
PH UR STRIP.AUTO: 7 [PH] (ref 4.5–8)
PLATELET # BLD AUTO: 217 THOUSANDS/UL (ref 149–390)
PMV BLD AUTO: 9.8 FL (ref 8.9–12.7)
POTASSIUM SERPL-SCNC: 4.2 MMOL/L (ref 3.5–5.3)
PROT SERPL-MCNC: 7.4 G/DL (ref 6.4–8.4)
PROT UR STRIP-MCNC: NEGATIVE MG/DL
RBC # BLD AUTO: 3.73 MILLION/UL (ref 3.88–5.62)
RBC #/AREA URNS AUTO: NORMAL /HPF
RSV RNA RESP QL NAA+PROBE: NEGATIVE
SARS-COV-2 RNA RESP QL NAA+PROBE: NEGATIVE
SODIUM SERPL-SCNC: 141 MMOL/L (ref 135–147)
SP GR UR STRIP.AUTO: 1.02 (ref 1–1.03)
UROBILINOGEN UR QL STRIP.AUTO: 0.2 E.U./DL
WBC # BLD AUTO: 7.25 THOUSAND/UL (ref 4.31–10.16)
WBC #/AREA URNS AUTO: NORMAL /HPF

## 2023-03-27 RX ORDER — ASPIRIN 81 MG/1
81 TABLET, CHEWABLE ORAL DAILY
Status: DISCONTINUED | OUTPATIENT
Start: 2023-03-28 | End: 2023-03-30 | Stop reason: HOSPADM

## 2023-03-27 RX ORDER — FUROSEMIDE 40 MG/1
40 TABLET ORAL DAILY
Status: DISCONTINUED | OUTPATIENT
Start: 2023-03-28 | End: 2023-03-30 | Stop reason: HOSPADM

## 2023-03-27 RX ORDER — LOSARTAN POTASSIUM 50 MG/1
100 TABLET ORAL DAILY
Status: DISCONTINUED | OUTPATIENT
Start: 2023-03-28 | End: 2023-03-30 | Stop reason: HOSPADM

## 2023-03-27 RX ORDER — LORATADINE 10 MG/1
10 TABLET ORAL DAILY
Status: DISCONTINUED | OUTPATIENT
Start: 2023-03-28 | End: 2023-03-30 | Stop reason: HOSPADM

## 2023-03-27 RX ORDER — COLCHICINE 0.6 MG/1
0.6 TABLET ORAL DAILY
Status: DISCONTINUED | OUTPATIENT
Start: 2023-03-28 | End: 2023-03-30

## 2023-03-27 RX ORDER — MAGNESIUM HYDROXIDE/ALUMINUM HYDROXICE/SIMETHICONE 120; 1200; 1200 MG/30ML; MG/30ML; MG/30ML
30 SUSPENSION ORAL EVERY 6 HOURS PRN
Status: DISCONTINUED | OUTPATIENT
Start: 2023-03-27 | End: 2023-03-30 | Stop reason: HOSPADM

## 2023-03-27 RX ORDER — PRAVASTATIN SODIUM 80 MG/1
80 TABLET ORAL
Status: DISCONTINUED | OUTPATIENT
Start: 2023-03-28 | End: 2023-03-30 | Stop reason: HOSPADM

## 2023-03-27 RX ORDER — FAMOTIDINE 20 MG/1
20 TABLET, FILM COATED ORAL DAILY
Status: DISCONTINUED | OUTPATIENT
Start: 2023-03-28 | End: 2023-03-30 | Stop reason: HOSPADM

## 2023-03-27 RX ORDER — ENOXAPARIN SODIUM 100 MG/ML
40 INJECTION SUBCUTANEOUS DAILY
Status: DISCONTINUED | OUTPATIENT
Start: 2023-03-28 | End: 2023-03-29

## 2023-03-27 RX ORDER — ONDANSETRON 2 MG/ML
4 INJECTION INTRAMUSCULAR; INTRAVENOUS EVERY 6 HOURS PRN
Status: DISCONTINUED | OUTPATIENT
Start: 2023-03-27 | End: 2023-03-30 | Stop reason: HOSPADM

## 2023-03-27 RX ORDER — ACETAMINOPHEN 325 MG/1
650 TABLET ORAL EVERY 4 HOURS PRN
Status: DISCONTINUED | OUTPATIENT
Start: 2023-03-27 | End: 2023-03-30 | Stop reason: HOSPADM

## 2023-03-27 RX ORDER — TAMSULOSIN HYDROCHLORIDE 0.4 MG/1
0.4 CAPSULE ORAL
Status: DISCONTINUED | OUTPATIENT
Start: 2023-03-28 | End: 2023-03-30 | Stop reason: HOSPADM

## 2023-03-27 RX ORDER — DOCUSATE SODIUM 100 MG/1
100 CAPSULE, LIQUID FILLED ORAL 2 TIMES DAILY
Status: DISCONTINUED | OUTPATIENT
Start: 2023-03-28 | End: 2023-03-30 | Stop reason: HOSPADM

## 2023-03-27 RX ADMIN — IOHEXOL 85 ML: 350 INJECTION, SOLUTION INTRAVENOUS at 19:13

## 2023-03-28 ENCOUNTER — APPOINTMENT (INPATIENT)
Dept: NON INVASIVE DIAGNOSTICS | Facility: HOSPITAL | Age: 88
End: 2023-03-28

## 2023-03-28 PROBLEM — E53.8 B12 DEFICIENCY: Status: ACTIVE | Noted: 2023-03-28

## 2023-03-28 LAB
ANION GAP SERPL CALCULATED.3IONS-SCNC: 7 MMOL/L (ref 4–13)
AORTIC ROOT: 3.5 CM
AORTIC VALVE MEAN VELOCITY: 12 M/S
APICAL FOUR CHAMBER EJECTION FRACTION: 69 %
ASCENDING AORTA: 3.4 CM
ATRIAL RATE: 78 BPM
ATRIAL RATE: 78 BPM
ATRIAL RATE: 79 BPM
ATRIAL RATE: 83 BPM
AV AREA BY CONTINUOUS VTI: 2.3 CM2
AV AREA PEAK VELOCITY: 2.2 CM2
AV LVOT MEAN GRADIENT: 2 MMHG
AV LVOT PEAK GRADIENT: 4 MMHG
AV MEAN GRADIENT: 7 MMHG
AV PEAK GRADIENT: 12 MMHG
AV VALVE AREA: 2.31 CM2
AV VELOCITY RATIO: 0.59
BUN SERPL-MCNC: 17 MG/DL (ref 5–25)
CALCIUM SERPL-MCNC: 9.3 MG/DL (ref 8.4–10.2)
CHLORIDE SERPL-SCNC: 106 MMOL/L (ref 96–108)
CHOLEST SERPL-MCNC: 161 MG/DL
CO2 SERPL-SCNC: 29 MMOL/L (ref 21–32)
CREAT SERPL-MCNC: 1.15 MG/DL (ref 0.6–1.3)
DOP CALC AO PEAK VEL: 1.74 M/S
DOP CALC AO VTI: 41.48 CM
DOP CALC LVOT AREA: 3.8 CM2
DOP CALC LVOT DIAMETER: 2.2 CM
DOP CALC LVOT PEAK VEL VTI: 25.24 CM
DOP CALC LVOT PEAK VEL: 1.02 M/S
DOP CALC LVOT STROKE INDEX: 46.9 ML/M2
DOP CALC LVOT STROKE VOLUME: 95.9 CM3
E WAVE DECELERATION TIME: 183 MS
ERYTHROCYTE [DISTWIDTH] IN BLOOD BY AUTOMATED COUNT: 13.3 % (ref 11.6–15.1)
EST. AVERAGE GLUCOSE BLD GHB EST-MCNC: 108 MG/DL
FOLATE SERPL-MCNC: >20 NG/ML (ref 3.1–17.5)
FRACTIONAL SHORTENING: 30 % (ref 28–44)
GFR SERPL CREATININE-BSD FRML MDRD: 57 ML/MIN/1.73SQ M
GLUCOSE SERPL-MCNC: 94 MG/DL (ref 65–140)
HBA1C MFR BLD: 5.4 %
HCT VFR BLD AUTO: 37.2 % (ref 36.5–49.3)
HDLC SERPL-MCNC: 55 MG/DL
HGB BLD-MCNC: 12.1 G/DL (ref 12–17)
INTERVENTRICULAR SEPTUM IN DIASTOLE (PARASTERNAL SHORT AXIS VIEW): 1 CM
INTERVENTRICULAR SEPTUM: 1 CM (ref 0.6–1.1)
LAAS-AP2: 20.5 CM2
LAAS-AP4: 19 CM2
LDLC SERPL CALC-MCNC: 90 MG/DL (ref 0–100)
LEFT ATRIUM SIZE: 3.9 CM
LEFT INTERNAL DIMENSION IN SYSTOLE: 3.5 CM (ref 2.1–4)
LEFT VENTRICLE DIASTOLIC VOLUME (MOD BIPLANE): 111 ML
LEFT VENTRICLE SYSTOLIC VOLUME (MOD BIPLANE): 33 ML
LEFT VENTRICULAR INTERNAL DIMENSION IN DIASTOLE: 5 CM (ref 3.5–6)
LEFT VENTRICULAR POSTERIOR WALL IN END DIASTOLE: 1 CM
LEFT VENTRICULAR STROKE VOLUME: 68 ML
LV EF: 70 %
LVSV (TEICH): 68 ML
MCH RBC QN AUTO: 32.3 PG (ref 26.8–34.3)
MCHC RBC AUTO-ENTMCNC: 32.5 G/DL (ref 31.4–37.4)
MCV RBC AUTO: 99 FL (ref 82–98)
MV E'TISSUE VEL-SEP: 8 CM/S
MV PEAK A VEL: 0.82 M/S
MV PEAK E VEL: 73 CM/S
MV STENOSIS PRESSURE HALF TIME: 53 MS
MV VALVE AREA P 1/2 METHOD: 4.15 CM2
P AXIS: 61 DEGREES
P AXIS: 68 DEGREES
P AXIS: 73 DEGREES
P AXIS: 77 DEGREES
PLATELET # BLD AUTO: 223 THOUSANDS/UL (ref 149–390)
PMV BLD AUTO: 10.2 FL (ref 8.9–12.7)
POTASSIUM SERPL-SCNC: 3.7 MMOL/L (ref 3.5–5.3)
PR INTERVAL: 184 MS
PR INTERVAL: 192 MS
PR INTERVAL: 192 MS
PR INTERVAL: 206 MS
QRS AXIS: 30 DEGREES
QRS AXIS: 32 DEGREES
QRS AXIS: 39 DEGREES
QRS AXIS: 40 DEGREES
QRSD INTERVAL: 100 MS
QRSD INTERVAL: 84 MS
QRSD INTERVAL: 96 MS
QRSD INTERVAL: 98 MS
QT INTERVAL: 352 MS
QT INTERVAL: 360 MS
QT INTERVAL: 362 MS
QT INTERVAL: 370 MS
QTC INTERVAL: 403 MS
QTC INTERVAL: 412 MS
QTC INTERVAL: 421 MS
QTC INTERVAL: 423 MS
RA PRESSURE ESTIMATED: 3 MMHG
RBC # BLD AUTO: 3.75 MILLION/UL (ref 3.88–5.62)
RIGHT ATRIUM AREA SYSTOLE A4C: 26.4 CM2
RIGHT VENTRICLE ID DIMENSION: 4.5 CM
RV PSP: 41 MMHG
SL CV LEFT ATRIUM LENGTH A2C: 5.4 CM
SL CV LV EF: 60
SL CV PED ECHO LEFT VENTRICLE DIASTOLIC VOLUME (MOD BIPLANE) 2D: 118 ML
SL CV PED ECHO LEFT VENTRICLE SYSTOLIC VOLUME (MOD BIPLANE) 2D: 50 ML
SODIUM SERPL-SCNC: 142 MMOL/L (ref 135–147)
T WAVE AXIS: 61 DEGREES
T WAVE AXIS: 62 DEGREES
T WAVE AXIS: 63 DEGREES
T WAVE AXIS: 64 DEGREES
TR MAX PG: 38 MMHG
TR PEAK VELOCITY: 3.1 M/S
TRICUSPID ANNULAR PLANE SYSTOLIC EXCURSION: 3.3 CM
TRICUSPID VALVE PEAK REGURGITATION VELOCITY: 3.07 M/S
TRIGL SERPL-MCNC: 79 MG/DL
TSH SERPL DL<=0.05 MIU/L-ACNC: 1.45 UIU/ML (ref 0.45–4.5)
VENTRICULAR RATE: 78 BPM
VENTRICULAR RATE: 78 BPM
VENTRICULAR RATE: 79 BPM
VENTRICULAR RATE: 83 BPM
VIT B12 SERPL-MCNC: 327 PG/ML (ref 100–900)
WBC # BLD AUTO: 7.89 THOUSAND/UL (ref 4.31–10.16)

## 2023-03-28 RX ORDER — CYANOCOBALAMIN 1000 UG/ML
1000 INJECTION, SOLUTION INTRAMUSCULAR; SUBCUTANEOUS
Status: DISCONTINUED | OUTPATIENT
Start: 2023-03-28 | End: 2023-03-30 | Stop reason: HOSPADM

## 2023-03-28 RX ADMIN — FAMOTIDINE 20 MG: 20 TABLET ORAL at 09:46

## 2023-03-28 RX ADMIN — TAMSULOSIN HYDROCHLORIDE 0.4 MG: 0.4 CAPSULE ORAL at 16:15

## 2023-03-28 RX ADMIN — CYANOCOBALAMIN TAB 500 MCG 1000 MCG: 500 TAB at 16:16

## 2023-03-28 RX ADMIN — LOSARTAN POTASSIUM 100 MG: 50 TABLET, FILM COATED ORAL at 09:46

## 2023-03-28 RX ADMIN — PRAVASTATIN SODIUM 80 MG: 80 TABLET ORAL at 16:15

## 2023-03-28 RX ADMIN — ASPIRIN 81 MG CHEWABLE TABLET 81 MG: 81 TABLET CHEWABLE at 09:47

## 2023-03-28 RX ADMIN — CYANOCOBALAMIN 1000 MCG: 1000 INJECTION, SOLUTION INTRAMUSCULAR at 16:17

## 2023-03-28 RX ADMIN — PERFLUTREN 1.2 ML/MIN: 6.52 INJECTION, SUSPENSION INTRAVENOUS at 09:00

## 2023-03-28 RX ADMIN — DOCUSATE SODIUM 100 MG: 100 CAPSULE, LIQUID FILLED ORAL at 17:21

## 2023-03-28 RX ADMIN — DOCUSATE SODIUM 100 MG: 100 CAPSULE, LIQUID FILLED ORAL at 09:45

## 2023-03-28 RX ADMIN — LORATADINE 10 MG: 10 TABLET ORAL at 09:47

## 2023-03-28 RX ADMIN — FUROSEMIDE 40 MG: 40 TABLET ORAL at 09:47

## 2023-03-28 RX ADMIN — ENOXAPARIN SODIUM 40 MG: 100 INJECTION SUBCUTANEOUS at 09:50

## 2023-03-28 NOTE — ASSESSMENT & PLAN NOTE
• Arrives for evaluation of difficulty walking, leg weakness  • Neurology evaluation placed  • B12 to be repleted  • MRI Pending  • PT/OT  • Evidence of carotid artery stenosis, will need OP follow up   If MRI positive will need to discuss potential CEA

## 2023-03-28 NOTE — ED PROVIDER NOTES
History  Chief Complaint   Patient presents with   • Difficulty Walking     Pt had difficulty walking earlier today  Here for further evaluation  Lots of stressors at home  HPI    Prior to Admission Medications   Prescriptions Last Dose Informant Patient Reported? Taking? Cholecalciferol ([employer] VITAMIN D3) 1000 units capsule   Yes No   Sig: Take by mouth   budesonide (Pulmicort Flexhaler) 180 MCG/ACT inhaler   No No   Sig: Inhale 4 puffs 2 (two) times a day for 7 days Rinse mouth after use     cholecalciferol (VITAMIN D3) 1,000 units tablet   No No   Sig: Take 2 tablets (2,000 Units total) by mouth daily for 14 days   colchicine (COLCRYS) 0 6 mg tablet   Yes No   Sig: Take 0 6 mg by mouth daily   docusate sodium (COLACE) 100 mg capsule   No No   Sig: Take 1 capsule (100 mg total) by mouth every 12 (twelve) hours   famotidine (PEPCID) 40 MG tablet   Yes No   Sig: Take 40 mg by mouth   fluticasone (VERAMYST) 27 5 MCG/SPRAY nasal spray   Yes No   Sig: into each nostril   furosemide (LASIX) 40 mg tablet   Yes No   Sig: Take 40 mg by mouth   hydrocortisone-pramoxine (PROCTOFOAM-HC) 1-1 % FOAM rectal foam   No No   Sig: Insert 1 applicator into the rectum 2 (two) times a day   loratadine (CLARITIN) 10 mg tablet   Yes No   Sig: Take 10 mg by mouth daily   losartan (COZAAR) 100 MG tablet   Yes No   Sig: Take 100 mg by mouth daily   potassium chloride (K-DUR,KLOR-CON) 20 mEq tablet   Yes No   Sig: Take 20 mEq by mouth   simvastatin (ZOCOR) 40 mg tablet   Yes No   Sig: Take 40 mg by mouth   tamsulosin (FLOMAX) 0 4 mg   No No   Sig: Take 1 capsule (0 4 mg total) by mouth daily with dinner      Facility-Administered Medications: None       Past Medical History:   Diagnosis Date   • Allergic rhinitis    • BPH with obstruction/lower urinary tract symptoms 2016   • Eye disorder    • Frequency of urination 2015   • Gout    • H/O gastroesophageal reflux (GERD)    • Hypercholesteremia    • Hypertension    • Incomplete bladder emptying 2016   • UTI (urinary tract infection) 2015       Past Surgical History:   Procedure Laterality Date   • CATARACT EXTRACTION, BILATERAL     • COLONOSCOPY  2002   • TONSILLECTOMY  1942       Family History   Problem Relation Age of Onset   • Heart failure Mother    • Heart attack Father    • Hypertension Father      I have reviewed and agree with the history as documented  E-Cigarette/Vaping     E-Cigarette/Vaping Substances     Social History     Tobacco Use   • Smoking status: Never   • Smokeless tobacco: Never   Substance Use Topics   • Alcohol use: Yes     Alcohol/week: 2 0 standard drinks     Types: 2 Glasses of wine per week   • Drug use: No       Review of Systems    Physical Exam  Physical Exam    Vital Signs  ED Triage Vitals   Temperature Pulse Respirations Blood Pressure SpO2   03/27/23 1438 03/27/23 1438 03/27/23 1438 03/27/23 1438 03/27/23 1438   97 8 °F (36 6 °C) 86 16 152/72 99 %      Temp Source Heart Rate Source Patient Position - Orthostatic VS BP Location FiO2 (%)   03/27/23 1438 03/27/23 1654 03/27/23 1654 03/27/23 1654 --   Oral Monitor Lying Left arm       Pain Score       03/27/23 1438       No Pain           Vitals:    03/27/23 1438 03/27/23 1654 03/27/23 1810 03/27/23 1930   BP: 152/72 170/73 (!) 196/81 169/80   Pulse: 86 75 76 89   Patient Position - Orthostatic VS:  Lying Lying Lying         Visual Acuity  Visual Acuity    Flowsheet Row Most Recent Value   L Pupil Size (mm) 3   R Pupil Size (mm) 3          ED Medications  Medications   iohexol (OMNIPAQUE) 350 MG/ML injection (SINGLE-DOSE) 85 mL (85 mL Intravenous Given 3/27/23 1913)       Diagnostic Studies  Results Reviewed     Procedure Component Value Units Date/Time    FLU/RSV/COVID - if FLU/RSV clinically relevant [[id number]] Collected: 03/27/23 2104    Lab Status:  In process Specimen: Nares from Nose Updated: 03/27/23 2109    HS Troponin I 2hr [[id number]]  (Normal) Collected: 03/27/23 1810    Lab Status: Final result Specimen: Blood from Arm, Left Updated: 03/27/23 1839     hs TnI 2hr 16 ng/L      Delta 2hr hsTnI 3 ng/L     Urine Microscopic [[id number]]  (Normal) Collected: 03/27/23 1650    Lab Status: Final result Specimen: Urine, Clean Catch Updated: 03/27/23 1733     RBC, UA None Seen /hpf      WBC, UA 2-4 /hpf      Epithelial Cells None Seen /hpf      Bacteria, UA None Seen /hpf     POCT urinalysis dipstick [[id number]]  (Abnormal) Resulted: 03/27/23 1653    Lab Status: Final result Specimen: Urine Updated: 03/27/23 1653     Color, UA Yellow     Clarity, UA --     EXT Leukocytes, UA Small     Nitrite, UA --     Protein, UA -- mg/dl      Glucose, UA --     Ketones, UA -- mg/dl      EXT Urobilinogen, UA --      Bilirubin, UA --     Blood, UA --    Urine Macroscopic, POC [[id number]]  (Abnormal) Collected: 03/27/23 1650    Lab Status: Final result Specimen: Urine Updated: 03/27/23 1651     Color, UA Yellow     Clarity, UA Clear     pH, UA 7 0     Leukocytes, UA Small     Nitrite, UA Negative     Protein, UA Negative mg/dl      Glucose, UA Negative mg/dl      Ketones, UA Negative mg/dl      Urobilinogen, UA 0 2 E U /dl      Bilirubin, UA Negative     Occult Blood, UA Negative     Specific Gravity, UA 1 025    Narrative:      CLINITEK RESULT    HS Troponin I 4hr [[id number]]     Lab Status: No result Specimen: Blood     HS Troponin 0hr (reflex protocol) [[id number]]  (Normal) Collected: 03/27/23 1559    Lab Status: Final result Specimen: Blood from Arm, Left Updated: 03/27/23 1627     hs TnI 0hr 13 ng/L     Basic metabolic panel [[id number]]  (Abnormal) Collected: 03/27/23 1559    Lab Status: Final result Specimen: Blood from Arm, Left Updated: 03/27/23 1621     Sodium 141 mmol/L      Potassium 4 2 mmol/L      Chloride 103 mmol/L      CO2 31 mmol/L      ANION GAP 7 mmol/L      BUN 20 mg/dL      Creatinine 1 36 mg/dL      Glucose 116 mg/dL      Calcium 9 8 mg/dL      eGFR 46 ml/min/1 73sq m     Narrative:      [employer] guidelines for Chronic Kidney Disease (CKD):   •  Stage 1 with normal or high GFR (GFR > 90 mL/min/1 73 square meters)  •  Stage 2 Mild CKD (GFR = 60-89 mL/min/1 73 square meters)  •  Stage 3A Moderate CKD (GFR = 45-59 mL/min/1 73 square meters)  •  Stage 3B Moderate CKD (GFR = 30-44 mL/min/1 73 square meters)  •  Stage 4 Severe CKD (GFR = 15-29 mL/min/1 73 square meters)  •  Stage 5 End Stage CKD (GFR <15 mL/min/1 73 square meters)  Note: GFR calculation is accurate only with a steady state creatinine    Hepatic function panel [[id number]]  (Abnormal) Collected: 03/27/23 1559    Lab Status: Final result Specimen: Blood from Arm, Left Updated: 03/27/23 1621     Total Bilirubin 0 55 mg/dL      Bilirubin, Direct 0 04 mg/dL      Alkaline Phosphatase 65 U/L      AST 11 U/L      ALT 9 U/L      Total Protein 7 4 g/dL      Albumin 4 2 g/dL     CBC and differential [[id number]]  (Abnormal) Collected: 03/27/23 1559    Lab Status: Final result Specimen: Blood from Arm, Left Updated: 03/27/23 1605     WBC 7 25 Thousand/uL      RBC 3 73 Million/uL      Hemoglobin 12 1 g/dL      Hematocrit 37 0 %      MCV 99 fL      MCH 32 4 pg      MCHC 32 7 g/dL      RDW 13 2 %      MPV 9 8 fL      Platelets 812 Thousands/uL      nRBC 0 /100 WBCs      Neutrophils Relative 81 %      Immat GRANS % 0 %      Lymphocytes Relative 14 %      Monocytes Relative 5 %      Eosinophils Relative 0 %      Basophils Relative 0 %      Neutrophils Absolute 5 82 Thousands/µL      Immature Grans Absolute 0 02 Thousand/uL      Lymphocytes Absolute 1 02 Thousands/µL      Monocytes Absolute 0 36 Thousand/µL      Eosinophils Absolute 0 00 Thousand/µL      Basophils Absolute 0 03 Thousands/µL                  CTA head and neck with and without contrast   Final Result by Rishabh Metz MD (03/27 1945)      No acute intracranial abnormality  No cervical or intracranial large vessel occlusion        Bilateral cervical carotid atherosclerotic disease, more notable on the right where there is a prominent ICA origin penetrating ulcer  No hemodynamically significant carotid stenosis  Stable generalized atrophy, somewhat temporal and central predominant, with mildly disproportionate ventriculomegaly to sulcal prominence  In the appropriate clinical setting, consider NPH  Workstation performed: [id number]         XR chest 1 view portable   Final Result by Carmita Cruz MD (03/27 7007)      No acute cardiopulmonary disease  Workstation performed: [id number]                    Procedures  Procedures         ED Course                                             MDM    Disposition  Final diagnoses:   Leg weakness     Time reflects when diagnosis was documented in both MDM as applicable and the Disposition within this note     Time User Action Codes Description Comment    3/27/2023  9:05 PM Beverly Springer Add [V11 186] Leg weakness       ED Disposition     ED Disposition   Admit    Condition   Stable    Date/Time   Mon Mar 27, 2023  9:05 PM    Comment   Case was discussed with DENISE and the patient's admission status was agreed to be Admission Status: observation status to the service of Dr Horace Michelle  Follow-up Information    None         Patient's Medications   Discharge Prescriptions    No medications on file       No discharge procedures on file      PDMP Review     None          ED Provider  Electronically Signed by Specimen: Blood from Arm, Left Updated: 03/27/23 1627     hs TnI 0hr 13 ng/L     Basic metabolic panel [[id number]]  (Abnormal) Collected: 03/27/23 1559    Lab Status: Final result Specimen: Blood from Arm, Left Updated: 03/27/23 1621     Sodium 141 mmol/L      Potassium 4 2 mmol/L      Chloride 103 mmol/L      CO2 31 mmol/L      ANION GAP 7 mmol/L      BUN 20 mg/dL      Creatinine 1 36 mg/dL      Glucose 116 mg/dL      Calcium 9 8 mg/dL      eGFR 46 ml/min/1 73sq m     Narrative:      [facility] guidelines for Chronic Kidney Disease (CKD):   •  Stage 1 with normal or high GFR (GFR > 90 mL/min/1 73 square meters)  •  Stage 2 Mild CKD (GFR = 60-89 mL/min/1 73 square meters)  •  Stage 3A Moderate CKD (GFR = 45-59 mL/min/1 73 square meters)  •  Stage 3B Moderate CKD (GFR = 30-44 mL/min/1 73 square meters)  •  Stage 4 Severe CKD (GFR = 15-29 mL/min/1 73 square meters)  •  Stage 5 End Stage CKD (GFR <15 mL/min/1 73 square meters)  Note: GFR calculation is accurate only with a steady state creatinine    Hepatic function panel [[id number]]  (Abnormal) Collected: 03/27/23 1559    Lab Status: Final result Specimen: Blood from Arm, Left Updated: 03/27/23 1621     Total Bilirubin 0 55 mg/dL      Bilirubin, Direct 0 04 mg/dL      Alkaline Phosphatase 65 U/L      AST 11 U/L      ALT 9 U/L      Total Protein 7 4 g/dL      Albumin 4 2 g/dL     CBC and differential [[id number]]  (Abnormal) Collected: 03/27/23 1559    Lab Status: Final result Specimen: Blood from Arm, Left Updated: 03/27/23 1605     WBC 7 25 Thousand/uL      RBC 3 73 Million/uL      Hemoglobin 12 1 g/dL      Hematocrit 37 0 %      MCV 99 fL      MCH 32 4 pg      MCHC 32 7 g/dL      RDW 13 2 %      MPV 9 8 fL      Platelets 274 Thousands/uL      nRBC 0 /100 WBCs      Neutrophils Relative 81 %      Immat GRANS % 0 %      Lymphocytes Relative 14 %      Monocytes Relative 5 %      Eosinophils Relative 0 %      Basophils Relative 0 %      Neutrophils Absolute 5 82 Thousands/µL      Immature Grans Absolute 0 02 Thousand/uL      Lymphocytes Absolute 1 02 Thousands/µL      Monocytes Absolute 0 36 Thousand/µL      Eosinophils Absolute 0 00 Thousand/µL      Basophils Absolute 0 03 Thousands/µL                  MRI brain wo contrast   Final Result by Alex Ruiz MD (03/29 1056)      No mass effect, acute intracranial hemorrhage or evidence of recent infarction  Age-related involutional and mild chronic microvascular ischemic change  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  Stable ventriculomegaly, slightly out of proportion to the degree of cerebral volume loss and should be correlated clinically for the presence of normal pressure hydrocephalus  Workstation performed: [id number]         VAS carotid complete study   Final Result by Esteban Lopez MD (03/28 6953)      CTA head and neck with and without contrast   Final Result by Rishabh Metz MD (03/27 1945)      No acute intracranial abnormality  No cervical or intracranial large vessel occlusion  Bilateral cervical carotid atherosclerotic disease, more notable on the right where there is a prominent ICA origin penetrating ulcer  No hemodynamically significant carotid stenosis  Stable generalized atrophy, somewhat temporal and central predominant, with mildly disproportionate ventriculomegaly to sulcal prominence  In the appropriate clinical setting, consider NPH  Workstation performed: [id number]         XR chest 1 view portable   Final Result by Сергей Manuel MD (03/27 3653)      No acute cardiopulmonary disease  Workstation performed: [id number]                    Procedures  Procedures         ED Course                                             Medical Decision Making  81-year-old male came to the hospital for evaluation of leg weakness/difficulty walking and perhaps some speech difficulty earlier today  It seems to have resolved at this point  CTA performed in the emergency department did demonstrate bilateral carotid stenosis with potential ulceration  Laboratory studies, EKG performed to rule out other life-threatening pathology such as ACS were reassuring  Patient is doing better clinically now but will require admission for stroke pathway and neuro eval     Leg weakness: acute illness or injury  Amount and/or Complexity of Data Reviewed  Labs: ordered  Decision-making details documented in ED Course  Radiology: ordered and independent interpretation performed  Decision-making details documented in ED Course  ECG/medicine tests: ordered and independent interpretation performed  Risk  Prescription drug management  Decision regarding hospitalization            Disposition  Final diagnoses:   Leg weakness     Time reflects when diagnosis was documented in both MDM as applicable and the Disposition within this note     Time User Action Codes Description Comment    3/27/2023  9:05 PM Donnis Crest Add [R29 898] Leg weakness     3/28/2023  7:13 AM Lisha Rekha C Add [R29 90] Stroke-like symptom     3/28/2023  7:13 AM Salena Fongi Add [R26 2] Ambulatory dysfunction     3/28/2023  1:40 PM Levester Mercy Add [R26 9] Neurologic gait dysfunction     3/29/2023  1:10 PM Butt Daring Modify [R29 898] Leg weakness     3/29/2023  1:10 PM Butt Daring Modify [R29 90] Stroke-like symptom     3/29/2023  1:10 PM Butt Daring Modify [R26 2] Ambulatory dysfunction     3/29/2023  1:10 PM Butt Daring Modify [R26 9] Neurologic gait dysfunction     3/29/2023  1:54 PM Van Reins Add [R31 9] Hematuria     3/30/2023  9:54 AM Tiffanie Reins Add [E53 8] B12 deficiency       ED Disposition     ED Disposition   Admit    Condition   Stable    Date/Time   Mon Mar 27, 2023  9:05 PM    Comment   Case was discussed with DENISE and the patient's admission status was agreed to be Admission Status: observation status to the service of Dr Shane Carpenter             Follow-up Information     Follow up With Specialties Details Why Contact Info Additional [address] Neurosurgery Follow up Please call for appt at ELIJAH AUGUSTINE JR  [facility] clinic [address] [facility] [id number] Pending sale to [facility] [phone]  [facility] [address] [facility], [place], [place], [address]qu[address]order[address]    Jean Claude Mallory, DO  Schedule an appointment as soon as possible for a visit 1 week follow up [address] [facility] [place] [facility] [place]  [address] [facility] [address] [facility]  [phone]             Discharge Medication List as of 3/30/2023  1:57 PM      START taking these medications    Details   aspirin (ECOTRIN LOW STRENGTH) 81 mg EC tablet Take 1 tablet (81 mg total) by mouth daily Enteric coated, Starting Thu 3/30/2023, Until Sat 4/29/2023, Normal      ciprofloxacin (CIPRO) 500 mg tablet Take 1 tablet (500 mg total) by mouth every 12 (twelve) hours for 10 doses, Starting Thu 3/30/2023, Until Tue 4/4/2023, Normal      cyanocobalamin (VITAMIN B-12) 1000 MCG tablet Take 1 tablet (1,000 mcg total) by mouth daily Do not start before March 31, 2023 , Starting Fri 3/31/2023, Until Sun 4/30/2023, Normal         CONTINUE these medications which have NOT CHANGED    Details   budesonide (Pulmicort Flexhaler) 180 MCG/ACT inhaler Inhale 4 puffs 2 (two) times a day for 7 days Rinse mouth after use , Starting Sun 10/16/2022, Until Sun 10/23/2022, Normal      Cholecalciferol 25 MCG (1000 UT) capsule Take by mouth, Historical Med      docusate sodium (COLACE) 100 mg capsule Take 1 capsule (100 mg total) by mouth every 12 (twelve) hours, Starting Tue 7/6/2021, Normal      famotidine (PEPCID) 40 MG tablet Take 40 mg by mouth daily at bedtime, Historical Med      furosemide (LASIX) 40 mg tablet Take 40 mg by mouth, Historical Med      loratadine (CLARITIN) 10 mg tablet Take 10 mg by mouth daily, Historical Med      losartan (COZAAR) 100 MG tablet Take 100 mg by mouth daily, Historical Med      potassium chloride (K-DUR,KLOR-CON) 20 mEq tablet Take 20 mEq by mouth, Historical Med      simvastatin (ZOCOR) 40 mg tablet Take 40 mg by mouth, Historical Med      tamsulosin (FLOMAX) 0 4 mg Take 1 capsule (0 4 mg total) by mouth daily with dinner, Starting Thu 3/24/2022, Normal         STOP taking these medications       cholecalciferol (VITAMIN D3) 1,000 units tablet Comments:   Reason for Stopping:         colchicine (COLCRYS) 0 6 mg tablet Comments:   Reason for Stopping:         fluticasone (VERAMYST) 27 5 MCG/SPRAY nasal spray Comments:   Reason for Stopping:         hydrocortisone-pramoxine (PROCTOFOAM-HC) 1-1 % FOAM rectal foam Comments:   Reason for Stopping:               Outpatient Discharge Orders   Ambulatory Referral to Neurosurgery   Standing Status: Future Standing Exp  Date: 09/29/23      Ambulatory Referral to Vascular Surgery   Standing Status: Future Standing Exp   Date: 03/30/24      Discharge Diet     Activity as tolerated       PDMP Review     None          ED Provider  Electronically Signed by           Raul Menchaac MD  04/16/23 6265

## 2023-03-28 NOTE — CONSULTS
Consultation - Neurology   Giovanna Pereira 81 y o  male MRN: [id number]  Unit/Bed#: E4 -02 Encounter: [id number]    Assessment/Plan    Ambulatory dysfunction  Assessment & Plan  The patient is a pleasant 81-year-old male with history of GERD, hypertension, hypercholesteremia, seasonal allergies and BPH who presents with difficulty walking  Per record review the patient does not take an antiplatelet medication at home  CTA of the head and neck showed no acute intracranial abnormality, no cervical intracranial large vessel occlusion, however did show bilateral cervical carotid stenosis disease more notable on the right where there is a prominent ICA origin penetrating ulcer  No hemodynamic significant carotid stenosis  Dana generalized atrophy  He was noted to be hypertensive admission, with blood pressures running from 439F to 754O systolic, with his highest blood pressure being 196/81 mmhg  · Ambulatory dysfunction -by description this does not appear to be a stroke or TIA, the patient was hypertensive on arrival and unclear if this was contributory  This may all be due to deconditioning/there may be features of neuropathy on examination, no evidence on examination suspect myopathy  Check MRI of cervical spine to rule out myelopathy  Subtle parkinsonism features on examination     · Hypertensive urgency  · Bilateral cervical carotid stenosis more notable on the right where there is a prominent ICA origin penetrating ulcer, no hemodynamic significant carotid stenosis, suspect asymptomatic, MRI of brain pending  · Hyperlipidemia    • MRI brain with out contrast   • MRI of cervical spine with out contrast, rule cervical stenosis  • Lipid Panel -LDL 90  • Hemoglobin A1c -5 4  • Aspirin 81 mg, with evidence of bilateral carotid stenosis, more notable on the right where there is a prominent ICA origin penetrating Ulcer  • Atorvastatin 40 mg  • B12 pending, would recommend checking TSH  • Continue telemetry  • PT/OT/ST  • Fall precautions  • Frequent neuro checks  Continue to monitor and notify neurology with any changes  • Vascular consultation, reviewed with vascular surgery reason for consultation   - Medical management and supportive care per primary team  Correction of any metabolic or infectious disturbances  Thelma Villareal will need follow up in in 6 weeks with movement disorder and memory attending/AP  He will not require outpatient neurological testing  History of Present Illness   Reason for Consult / Principal Problem:   Leg weakness, on stroke pathway    HPI: Thelma Villareal is a 81 y o  male with history of GERD, hypertension, hypercholesteremia, seasonal allergies and BPH who presents with difficulty walking  Per record review the patient does not take an antiplatelet medication at home  As document the patient uses a cane to walk, per initial HPI by medicine team he initially walks okay on flat surfaces  However today while working on ground with elevation changes he was unable to get around on the surface  He reported that he had a mental block and cannot elevate his legs enough to get over the surface, this was associated with leg weakness which is new for him  In the ED his symptoms had resolved  He denied any other neurological associated symptoms with this  His initial blood pressure in the ED was 152/72, he was afebrile with a pulse of 86 and pulse ox of 99, respirations of 16  However during the stay his blood pressures have been running between the 993V and 708S systolic, with a reading as high as 196/81 mmhg      Labs reviewed  CMP did show a elevated creatinine however this was elevated in the past  CBC did not show leukocytosis  Hepatic testing was essentially normal  trops negative  Hemoglobin A1c was 5 4  UA showed small leukocytes  LDL was 90    CTA of the head and neck showed no acute intracranial abnormality, no cervical intracranial large vessel occlusion, however did show bilateral cervical carotid stenosis disease more notable on the right where there is a prominent ICA origin and penetrating ulcer  No hemodynamic significant carotid stenosis  Stabilized generalized atrophy  The patient denies any prior neurological history, he denies history of stroke, dementia, Parkinson disease, or demyelinating disease history  He reports that he does have difficulty with ambulation and uses a cane, at times he can have difficulty with this  He also endorses numbness in his lower extremities, which is chronic  He reports this will come and go  He denies any problems with his mentation  No neck pain or lower back pain, no radicular complaints  He reports that on Monday he was his usual state of health, no strokelike symptoms  He was going to an appointment with Alyse Monaco, when he was walking at his apartment there was a slight grade, which is not unusual for him to walk on  He all of a sudden felt bilateral lower extremity weakness and shakiness, and feeling as if he is going to fall but did not fall  He sat down for a few minutes and then this past   He was able to perform all his activities of daily living after this and go to the bathroom, there was no recurrence of symptoms  During this entire event he denied any loss of consciousness or presyncopal feeling, he denied any seizure-like events, he denied one-sided weakness, one-sided numbness, ataxia, he denied problems with his vision, problems with his speech, or facial droop  He is currently back to his baseline with no complaints, he offers no neurological complaints at this time  No current neurological symptoms  Inpatient consult to Neurology  Consult performed by: Thuy Trivedi PA-C  Consult ordered by: Sammie Hernadez PA-C        Review of Systems  12 point ROS, as per HPI, otherwise negative      Historical Information   Past Medical History:   Diagnosis Date   • Allergic rhinitis    • BPH with obstruction/lower urinary tract symptoms 2016   • Eye disorder    • Frequency of urination 2015   • Gout    • H/O gastroesophageal reflux (GERD)    • Hypercholesteremia    • Hypertension    • Incomplete bladder emptying 2016   • UTI (urinary tract infection) 2015     Past Surgical History:   Procedure Laterality Date   • CATARACT EXTRACTION, BILATERAL     • COLONOSCOPY  2002   • TONSILLECTOMY  1942     Social History   Social History     Substance and Sexual Activity   Alcohol Use Yes   • Alcohol/week: 2 0 standard drinks   • Types: 2 Glasses of wine per week     Social History     Substance and Sexual Activity   Drug Use No     E-Cigarette/Vaping     E-Cigarette/Vaping Substances     Social History     Tobacco Use   Smoking Status Never   Smokeless Tobacco Never     Family History:   Family History   Problem Relation Age of Onset   • Heart failure Mother    • Heart attack Father    • Hypertension Father      Review of previous medical records was completed, please see HPI for details       Meds/Allergies   all current active meds have been reviewed, current meds:   Current Facility-Administered Medications   Medication Dose Route Frequency   • acetaminophen (TYLENOL) tablet 650 mg  650 mg Oral Q4H PRN   • aluminum-magnesium hydroxide-simethicone (MYLANTA) oral suspension 30 mL  30 mL Oral Q6H PRN   • aspirin chewable tablet 81 mg  81 mg Oral Daily   • colchicine (COLCRYS) tablet 0 6 mg  0 6 mg Oral Daily   • docusate sodium (COLACE) capsule 100 mg  100 mg Oral BID   • enoxaparin (LOVENOX) subcutaneous injection 40 mg  40 mg Subcutaneous Daily   • famotidine (PEPCID) tablet 20 mg  20 mg Oral Daily   • furosemide (LASIX) tablet 40 mg  40 mg Oral Daily   • loratadine (CLARITIN) tablet 10 mg  10 mg Oral Daily   • losartan (COZAAR) tablet 100 mg  100 mg Oral Daily   • ondansetron (ZOFRAN) injection 4 mg  4 mg Intravenous Q6H PRN   • pravastatin (PRAVACHOL) tablet 80 mg  80 mg Oral Daily With Dinner   • tamsulosin (FLOMAX) capsule 0 4 mg  0 4 mg Oral Daily With Dinner    and PTA meds:   Prior to Admission Medications   Prescriptions Last Dose Informant Patient Reported? Taking? Cholecalciferol 25 MCG (1000 UT) capsule 3/28/2023  Yes Yes   Sig: Take by mouth   budesonide (Pulmicort Flexhaler) 180 MCG/ACT inhaler   No No   Sig: Inhale 4 puffs 2 (two) times a day for 7 days Rinse mouth after use  cholecalciferol (VITAMIN D3) 1,000 units tablet   No No   Sig: Take 2 tablets (2,000 Units total) by mouth daily for 14 days   colchicine (COLCRYS) 0 6 mg tablet Not Taking  Yes No   Sig: Take 0 6 mg by mouth daily   Patient not taking: Reported on 3/28/2023   docusate sodium (COLACE) 100 mg capsule Past Month  No Yes   Sig: Take 1 capsule (100 mg total) by mouth every 12 (twelve) hours   famotidine (PEPCID) 40 MG tablet 3/27/2023  Yes Yes   Sig: Take 40 mg by mouth daily at bedtime   fluticasone (VERAMYST) 27 5 MCG/SPRAY nasal spray   Yes No   Sig: into each nostril   furosemide (LASIX) 40 mg tablet 3/28/2023  Yes Yes   Sig: Take 40 mg by mouth   hydrocortisone-pramoxine (PROCTOFOAM-HC) 1-1 % FOAM rectal foam   No No   Sig: Insert 1 applicator into the rectum 2 (two) times a day   loratadine (CLARITIN) 10 mg tablet 3/28/2023  Yes Yes   Sig: Take 10 mg by mouth daily   losartan (COZAAR) 100 MG tablet 3/28/2023  Yes Yes   Sig: Take 100 mg by mouth daily   potassium chloride (K-DUR,KLOR-CON) 20 mEq tablet 3/28/2023  Yes Yes   Sig: Take 20 mEq by mouth   simvastatin (ZOCOR) 40 mg tablet 3/28/2023  Yes Yes   Sig: Take 40 mg by mouth   tamsulosin (FLOMAX) 0 4 mg 3/28/2023  No Yes   Sig: Take 1 capsule (0 4 mg total) by mouth daily with dinner      Facility-Administered Medications: None     Allergies   Allergen Reactions   • Penicillins Other (See Comments)     Pt is unsure if allergic       Objective   Vitals:Blood pressure 142/66, pulse 70, temperature 99 °F (37 2 °C), "temperature source Temporal, resp  rate 18, height 5' 10\" (1 778 m), weight 78 5 kg (173 lb 1 oz), SpO2 96 %  ,Body mass index is 24 83 kg/m²  Intake/Output Summary (Last 24 hours) at 3/28/2023 1159  Last data filed at 3/28/2023 1019  Gross per 24 hour   Intake 570 ml   Output 230 ml   Net 340 ml     Invasive Devices: Invasive Devices     Peripheral Intravenous Line  Duration           Peripheral IV 03/27/23 Left Antecubital <1 day              Vital signs reviewed  Constitutional - in NAD, lying in bed in no acute distress pleasant and cooperative, able to provide entire history  HEENT - NC/AT, no neck pain or neck stiffness, no evidence of meningismus  Cardiac - rate regular  Lungs -no respiratory distress noted  Abdomen - non distended  Extremities - No edema noted  Skin - no rashes noted/observed  Neurological  Mental status -the patient is awake and alert oriented x3, he is able to tell me the month, exact day of week, president [place]  He is able to do simple calculations and spell world forwards and backwards  He was able to tell me current events  Was able to follow simple and complex cross body commands  He was able to read and repeat  He had good knowledge into current medical condition  There was some hesitancy in his speech, however no gross aphasia or dysarthria  He was able to tell me why he was in the hospital and provide entire history  Cranial nerves 2 through 12 -pupils were equal and reactive, extract movements are intact without any disconjugate gaze or gaze palsy, visual fields full, V1 through V3 intact to touch and temperature, no facial droop noted, tongue was midline, hearing intact to conversation, sternocleidomastoids intact  Motor - 5/5 upper extremities and lower extremities, without drift, and bulk, except he did have bilateral hip flexion extension mild weakness 5- out of 5     There was slight increase in tone in the uppers and lowers, there was no cog wheeling " ridigity noted  No tremor or fixed deficit noted  Rapid movements are equal bilaterally  Sensation - nonfocal to touch or temperature, he did have a slight decrease in vibratory sense in the bilateral lowers at the toes - compared to ankles, at the fingers on the left   Coordination - no ataxia noted on finger-to-nose   Reflexes are equal - brisk in the uppers and lowers, symmetric 2-3 in the uppers, 3 at the knees, no clonus, negative castellano's  Toes are downgoing bilaterally  No evidence of seizure activity, observed       Lab Results:     Recent Results (from the past 24 hour(s))   Basic metabolic panel    Collection Time: 03/27/23  3:59 PM   Result Value Ref Range    Sodium 141 135 - 147 mmol/L    Potassium 4 2 3 5 - 5 3 mmol/L    Chloride 103 96 - 108 mmol/L    CO2 31 21 - 32 mmol/L    ANION GAP 7 4 - 13 mmol/L    BUN 20 5 - 25 mg/dL    Creatinine 1 36 (H) 0 60 - 1 30 mg/dL    Glucose 116 65 - 140 mg/dL    Calcium 9 8 8 4 - 10 2 mg/dL    eGFR 46 ml/min/1 73sq m   CBC and differential    Collection Time: 03/27/23  3:59 PM   Result Value Ref Range    WBC 7 25 4 31 - 10 16 Thousand/uL    RBC 3 73 (L) 3 88 - 5 62 Million/uL    Hemoglobin 12 1 12 0 - 17 0 g/dL    Hematocrit 37 0 36 5 - 49 3 %    MCV 99 (H) 82 - 98 fL    MCH 32 4 26 8 - 34 3 pg    MCHC 32 7 31 4 - 37 4 g/dL    RDW 13 2 11 6 - 15 1 %    MPV 9 8 8 9 - 12 7 fL    Platelets 589 606 - 136 Thousands/uL    nRBC 0 /100 WBCs    Neutrophils Relative 81 (H) 43 - 75 %    Immat GRANS % 0 0 - 2 %    Lymphocytes Relative 14 14 - 44 %    Monocytes Relative 5 4 - 12 %    Eosinophils Relative 0 0 - 6 %    Basophils Relative 0 0 - 1 %    Neutrophils Absolute 5 82 1 85 - 7 62 Thousands/µL    Immature Grans Absolute 0 02 0 00 - 0 20 Thousand/uL    Lymphocytes Absolute 1 02 0 60 - 4 47 Thousands/µL    Monocytes Absolute 0 36 0 17 - 1 22 Thousand/µL    Eosinophils Absolute 0 00 0 00 - 0 61 Thousand/µL    Basophils Absolute 0 03 0 00 - 0 10 Thousands/µL   Hepatic "function panel    Collection Time: 03/27/23  3:59 PM   Result Value Ref Range    Total Bilirubin 0 55 0 20 - 1 00 mg/dL    Bilirubin, Direct 0 04 0 00 - 0 20 mg/dL    Alkaline Phosphatase 65 34 - 104 U/L    AST 11 (L) 13 - 39 U/L    ALT 9 7 - 52 U/L    Total Protein 7 4 6 4 - 8 4 g/dL    Albumin 4 2 3 5 - 5 0 g/dL   HS Troponin 0hr (reflex protocol)    Collection Time: 03/27/23  3:59 PM   Result Value Ref Range    hs TnI 0hr 13 \"Refer to ACS Flowchart\"- see link ng/L   Hemoglobin A1C    Collection Time: 03/27/23  3:59 PM   Result Value Ref Range    Hemoglobin A1C 5 4 Normal 3 8-5 6%; PreDiabetic 5 7-6 4%;  Diabetic >=6 5%; Glycemic control for adults with diabetes <7 0% %     mg/dl   Vitamin B12    Collection Time: 03/27/23  3:59 PM   Result Value Ref Range    Vitamin B-12 327 100 - 900 pg/mL   Folate    Collection Time: 03/27/23  3:59 PM   Result Value Ref Range    Folate >20 0 (H) 3 1 - 17 5 ng/mL   Urine Macroscopic, POC    Collection Time: 03/27/23  4:50 PM   Result Value Ref Range    Color, UA Yellow     Clarity, UA Clear     pH, UA 7 0 4 5 - 8 0    Leukocytes, UA Small (A) Negative    Nitrite, UA Negative Negative    Protein, UA Negative Negative mg/dl    Glucose, UA Negative Negative mg/dl    Ketones, UA Negative Negative mg/dl    Urobilinogen, UA 0 2 0 2, 1 0 E U /dl E U /dl    Bilirubin, UA Negative Negative    Occult Blood, UA Negative Negative    Specific Gravity, UA 1 025 1 003 - 1 030   Urine Microscopic    Collection Time: 03/27/23  4:50 PM   Result Value Ref Range    RBC, UA None Seen None Seen, 2-4 /hpf    WBC, UA 2-4 None Seen, 2-4, 5-60 /hpf    Epithelial Cells None Seen None Seen, Occasional /hpf    Bacteria, UA None Seen None Seen, Occasional /hpf   POCT urinalysis dipstick    Collection Time: 03/27/23  4:53 PM   Result Value Ref Range    Color, UA Yellow     Clarity, UA      EXT Leukocytes, UA Small (A)     Nitrite, UA      Protein, UA  mg/dl    Glucose, UA      Ketones, UA  mg/dl    EXT " "Urobilinogen, UA       Bilirubin, UA      Blood, UA     HS Troponin I 2hr    Collection Time: 03/27/23  6:10 PM   Result Value Ref Range    hs TnI 2hr 16 \"Refer to ACS Flowchart\"- see link ng/L    Delta 2hr hsTnI 3 <20 ng/L   FLU/RSV/COVID - if FLU/RSV clinically relevant    Collection Time: 03/27/23  9:04 PM    Specimen: Nose; Nares   Result Value Ref Range    SARS-CoV-2 Negative Negative    INFLUENZA A PCR Negative Negative    INFLUENZA B PCR Negative Negative    RSV PCR Negative Negative   Lipid Panel with Direct LDL reflex    Collection Time: 03/28/23  5:14 AM   Result Value Ref Range    Cholesterol 161 See Comment mg/dL    Triglycerides 79 See Comment mg/dL    HDL, Direct 55 >=40 mg/dL    LDL Calculated 90 0 - 100 mg/dL   Basic metabolic panel    Collection Time: 03/28/23  5:14 AM   Result Value Ref Range    Sodium 142 135 - 147 mmol/L    Potassium 3 7 3 5 - 5 3 mmol/L    Chloride 106 96 - 108 mmol/L    CO2 29 21 - 32 mmol/L    ANION GAP 7 4 - 13 mmol/L    BUN 17 5 - 25 mg/dL    Creatinine 1 15 0 60 - 1 30 mg/dL    Glucose 94 65 - 140 mg/dL    Calcium 9 3 8 4 - 10 2 mg/dL    eGFR 57 ml/min/1 73sq m   CBC (With Platelets)    Collection Time: 03/28/23  5:14 AM   Result Value Ref Range    WBC 7 89 4 31 - 10 16 Thousand/uL    RBC 3 75 (L) 3 88 - 5 62 Million/uL    Hemoglobin 12 1 12 0 - 17 0 g/dL    Hematocrit 37 2 36 5 - 49 3 %    MCV 99 (H) 82 - 98 fL    MCH 32 3 26 8 - 34 3 pg    MCHC 32 5 31 4 - 37 4 g/dL    RDW 13 3 11 6 - 15 1 %    Platelets 824 591 - 944 Thousands/uL    MPV 10 2 8 9 - 12 7 fL     Procedure: CTA head and neck with and without contrast    Result Date: 3/27/2023  Narrative: CTA NECK AND BRAIN WITH AND WITHOUT CONTRAST INDICATION: Transient ischemic attack (TIA) CVA like symptoms COMPARISON:   Head CT from March 20, 2019  TECHNIQUE:  Routine CT imaging of the Brain without contrast   Post contrast imaging was performed after administration of iodinated contrast through the neck and brain   Post " contrast axial 0 625 mm images timed to opacify the arterial system  3D rendering was performed on an independent workstation  MIP reconstructions performed  Coronal reconstructions were performed of the noncontrast portion of the brain  Radiation dose length product (DLP) for this visit:  1405 mGy-cm   This examination, like all CT scans performed in the [facility], was performed utilizing techniques to minimize radiation dose exposure, including the use of iterative reconstruction and automated exposure control  IV Contrast:  85 mL of iohexol (OMNIPAQUE)  IMAGE QUALITY:   Diagnostic FINDINGS: NONCONTRAST BRAIN PARENCHYMA:  No interval change in appearance of the brain parenchyma  Generalized cortical volume loss and mild periventricular chronic microangiopathic changes  VENTRICLES AND EXTRA-AXIAL SPACES:  Stable ventriculomegaly and dilatation of the sylvian fissures  Sylvian fissures are moderately dilated  Lateral and 3rd ventricle is more dilated and the 4th ventricle with a patent cerebral aqueduct  VISUALIZED ORBITS: Normal visualized orbits  PARANASAL SINUSES: Normal visualized paranasal sinuses  CERVICAL VASCULATURE AORTIC ARCH AND GREAT VESSELS:  Mild atherosclerotic disease of the arch, proximal great vessels and visualized subclavian vessels  No significant stenosis  RIGHT VERTEBRAL ARTERY CERVICAL SEGMENT:  Normal origin  The vessel is normal in caliber throughout the neck  LEFT VERTEBRAL ARTERY CERVICAL SEGMENT:  Normal origin  The vessel is normal in caliber throughout the neck  RIGHT EXTRACRANIAL CAROTID SEGMENT: Calcified and noncalcified atherosclerotic plaque with prominent ulceration (5:353) noted at the ICA origin  There is mild (less than 50% origin stenosis  No tandem stenosis in the remaining cervical ICA   LEFT EXTRACRANIAL CAROTID SEGMENT:  Mild atherosclerotic disease of the distal common carotid artery and proximal cervical internal carotid artery without significant stenosis compared to the more distal ICA  NASCET criteria was used to determine the degree of internal carotid artery diameter stenosis  INTRACRANIAL VASCULATURE INTERNAL CAROTID ARTERIES:  Normal enhancement of the intracranial portions of the internal carotid arteries  Normal ophthalmic artery origins  Normal ICA terminus  ANTERIOR CIRCULATION:  Symmetric A1 segments and anterior cerebral arteries with normal enhancement  Normal anterior communicating artery  MIDDLE CEREBRAL ARTERY CIRCULATION:  M1 segment and middle cerebral artery branches demonstrate normal enhancement bilaterally  DISTAL VERTEBRAL ARTERIES:  Normal distal vertebral arteries  Posterior inferior cerebellar artery origins are normal  Normal vertebral basilar junction  BASILAR ARTERY:  Basilar artery is normal in caliber  Normal superior cerebellar arteries  POSTERIOR CEREBRAL ARTERIES: Both posterior cerebral arteries arises from the basilar tip  Both arteries demonstrate normal enhancement  Normal posterior communicating arteries  VENOUS STRUCTURES:  Normal  NON VASCULAR ANATOMY BONY STRUCTURES:  No acute osseous abnormality  Multilevel cervical spondylosis with additional findings of diffuse idiopathic skeletal hyperostosis  Periapical lucency about the right upper molar dental implant  Additional osseous defect within the anterior maxilla, presumably representing a site of prior tooth extraction with residual communication between the floor of the nasal cavity and the roof of the oral cavity  Direct inspection is recommended  SOFT TISSUES OF THE NECK:  Unremarkable  THORACIC INLET:  Normal      Impression: No acute intracranial abnormality  No cervical or intracranial large vessel occlusion  Bilateral cervical carotid atherosclerotic disease, more notable on the right where there is a prominent ICA origin penetrating ulcer  No hemodynamically significant carotid stenosis   Stable generalized atrophy, somewhat temporal and central predominant, with mildly disproportionate ventriculomegaly to sulcal prominence  In the appropriate clinical setting, consider NPH  Workstation performed: [id number]     Procedure: XR chest 1 view portable    Result Date: 3/27/2023  Narrative: CHEST INDICATION:   altered mental status  COMPARISON:  CXR 10/16/2022  EXAM PERFORMED/VIEWS:  XR CHEST PORTABLE  FINDINGS: Cardiomediastinal silhouette normal  Lungs clear  No effusion or pneumothorax  Upper abdomen normal  Bones normal for age  Impression: No acute cardiopulmonary disease  Workstation performed: [id number]     Imaging Studies: I have personally reviewed pertinent reports  EKG, Pathology, and Other Studies: I have personally reviewed pertinent reports  Code Status: Level 3 - DNAR and DNI    Counseling / Coordination of Care  Reviewed case with neurology attending, history and physical examination, labs and imaging completed, plan of care as per attending physician  Please see attestation for further details

## 2023-03-28 NOTE — PLAN OF CARE
Problem: OCCUPATIONAL THERAPY ADULT  Goal: Performs self-care activities at highest level of function for planned discharge setting  See evaluation for individualized goals  Description: Treatment Interventions: ADL retraining, Functional transfer training, UE strengthening/ROM, Endurance training, Cognitive reorientation, Patient/family training, Compensatory technique education, Continued evaluation          See flowsheet documentation for full assessment, interventions and recommendations  Note: Limitation: Decreased ADL status, Decreased UE strength, Decreased Safe judgement during ADL, Decreased cognition, Decreased endurance, Decreased high-level ADLs  Prognosis: Fair  Assessment: Pt is a 88y/o male admitted to the hospital 2* symptoms of decreased ability to ambulate, LE weakness; CTA(brain)=neg acute findings  Pt with PMH BPH, eye disorder, gout, HTN, UTI  PTA pt states independence with his ADLs, transfers, and ambulation--with QC; neg falls, +  During initial eval, pt demonstrated deficits with his functional balance, functional mobility, ADL status, transfer safety, b/l UE strength, activity tolerance(currently fair=15-20mins), and cognition(i e memory)  Pt would benefit from continued OT tx for the above deficits  2-3xwk/1-2wks  The patient's raw score on the AM-PAC Daily Activity Inpatient Short Form is 21  A raw score of greater than or equal to 19 suggests the patient may benefit from discharge to home  Please refer to the recommendation of the Occupational Therapist for safe discharge planning       OT Discharge Recommendation: Home with home health rehabilitation

## 2023-03-28 NOTE — PLAN OF CARE
Problem: MOBILITY - ADULT  Goal: Maintain or return to baseline ADL function  Description: INTERVENTIONS:  -  Assess patient's ability to carry out ADLs; assess patient's baseline for ADL function and identify physical deficits which impact ability to perform ADLs (bathing, care of mouth/teeth, toileting, grooming, dressing, etc )  - Assess/evaluate cause of self-care deficits   - Assess range of motion  - Assess patient's mobility; develop plan if impaired  - Assess patient's need for assistive devices and provide as appropriate  - Encourage maximum independence but intervene and supervise when necessary  - Involve family in performance of ADLs  - Assess for home care needs following discharge   - Consider OT consult to assist with ADL evaluation and planning for discharge  - Provide patient education as appropriate  Outcome: Progressing  Goal: Maintains/Returns to pre admission functional level  Description: INTERVENTIONS:  - Perform BMAT or MOVE assessment daily    - Set and communicate daily mobility goal to care team and patient/family/caregiver  - Collaborate with rehabilitation services on mobility goals if consulted  - Perform Range of Motion 3 times a day  - Reposition patient every 2 hours  - Dangle patient 3 times a day  - Stand patient 3 times a day  - Ambulate patient 3 times a day  - Out of bed to chair 3 times a day   - Out of bed for meals 3 times a day  - Out of bed for toileting  - Record patient progress and toleration of activity level   Outcome: Progressing     Problem: Activity Intolerance/Impaired Mobility  Goal: Mobility/activity is maintained at optimum level for patient  Description: Interventions:  - Assess and monitor patient  barriers to mobility and need for assistive/adaptive devices  - Assess patient's emotional response to limitations  - Collaborate with interdisciplinary team and initiate plans and interventions as ordered  - Encourage independent activity per ability    - Maintain proper body alignment  - Perform active/passive rom as tolerated/ordered  - Plan activities to conserve energy   - Turn patient as appropriate  Outcome: Progressing     Problem: DISCHARGE PLANNING  Goal: Discharge to home or other facility with appropriate resources  Description: INTERVENTIONS:  - Identify barriers to discharge w/patient and caregiver  - Arrange for needed discharge resources and transportation as appropriate  - Identify discharge learning needs (meds, wound care, etc )  - Arrange for interpretive services to assist at discharge as needed  - Refer to Case Management Department for coordinating discharge planning if the patient needs post-hospital services based on physician/advanced practitioner order or complex needs related to functional status, cognitive ability, or social support system  Outcome: Progressing     Problem: Knowledge Deficit  Goal: Patient/family/caregiver demonstrates understanding of disease process, treatment plan, medications, and discharge instructions  Description: Complete learning assessment and assess knowledge base    Interventions:  - Provide teaching at level of understanding  - Provide teaching via preferred learning methods  Outcome: Progressing

## 2023-03-28 NOTE — ASSESSMENT & PLAN NOTE
Arrives for evaluation of difficulty walking, leg weakness  He states that he does have baseline difficulty and uses a cane, difficult walking on surfaces that change elevation, states he has a mental block and has difficulty lifting leg  Denies HA, vision changes, tongue midline, face symmetric, UE strength/sensory intact, LE strength/sensory intact, no speech changes    Plan:  • Assign to observation with tele for TIA/CVA  • VS: hypertensive on admission which is resolving, afebrile, on room air  • CBC without leukocytosis or anemia, CMP at baseline renal function, troponin negative, UA unremarkable  • CTA head/neck: no acute intracranial abnormality, no large vessel occlusion   Bilateral cervical carotid atherosclerotic disease more notable on R where there is a prominent ICA origin penetrating ulcer, no hemodynamically significant stenosis, stable generalized atrophy temporal/central predominant, mild disproportionate ventriculomegaly to sulcal prominence  • Check lipid panel, a1c, b12/folate  • MRI, TTE pending  • Consults: neurology, PT/OT

## 2023-03-28 NOTE — PLAN OF CARE
Problem: PHYSICAL THERAPY ADULT  Goal: Performs mobility at highest level of function for planned discharge setting  See evaluation for individualized goals  Description: Treatment/Interventions: Functional transfer training, LE strengthening/ROM, Therapeutic exercise, Endurance training, Patient/family training, Equipment eval/education, Bed mobility, Gait training, Compensatory technique education, Continued evaluation, Spoke to nursing, OT  Equipment Recommended: Andrew Elaine       See flowsheet documentation for full assessment, interventions and recommendations  Outcome: Progressing  Note: Prognosis: Good  Problem List: Decreased strength, Decreased endurance, Impaired balance, Decreased mobility, Impaired judgement, Decreased safety awareness  Assessment: Giovanna Pereira is a 81 y o  male with a PMH of HTN, HLD, BPH who presents with difficulty walking  CTA of the head and neck was ordered which demonstrates 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  Pending MRI  PT consulted  Ambulate orders  Prior to admission resides alone in apt with elevator access  Independent with use of QC prior to admission  Currently presents with functional limitations related to decreased activity tolerance, strength, balance, functional mobility and locomotion  S for bed mobility and transfers  Ernesto for ambulation using QC and hand held assistance  Gait slowed, unsteady  Will benefit from skilled PT intervention in order to optimize outcomes, facilitating return to independence  The patient's AM-PAC Basic Mobility Inpatient Short Form Raw Score is 21  A Raw score of greater than 16 suggests the patient may benefit from discharge to home  Please also refer to the recommendation of the Physical Therapist for safe discharge planning  Anticipate home with PT upon d/c  See treatment note for gait training with RW   RW recommended for ambulation          PT Discharge Recommendation: Home with home health rehabilitation    See flowsheet documentation for full assessment

## 2023-03-28 NOTE — ASSESSMENT & PLAN NOTE
The patient is a pleasant 61-year-old male with history of GERD, hypertension, hypercholesteremia, seasonal allergies and BPH who presents with acute ambulatory dysfunction, he froze while walking up a hill  Per record review the patient does not take an antiplatelet medication at home  CTA of the head and neck showed no acute intracranial abnormality, no cervical intracranial large vessel occlusion, however did show bilateral cervical carotid stenosis disease more notable on the right where there is a prominent ICA origin penetrating ulcer  No hemodynamic significant carotid stenosis  Dana generalized atrophy  He was noted to be hypertensive admission, with blood pressures running from 395P to 857M systolic, with his highest blood pressure being 196/81 mmhg  Her blood pressures have now improved  · Ambulatory dysfunction (transient worsening reported which has improved - frozen gait) -by description this does not appear to be a stroke or TIA, the patient was hypertensive on arrival and unclear if this was contributory  This may all be due to deconditioning/there may be features of neuropathy on examination, no evidence on examination suspect myopathy  Subtle parkinsonism features on examination  ? NPH on imaging     · Hypertensive urgency  · Bilateral cervical carotid stenosis more notable on the right where there is a prominent ICA origin penetrating ulcer, no hemodynamic significant carotid stenosis, suspect asymptomatic, MRI of brain pending  · Hyperlipidemia  · B12 deficiency    • MRI brain with out contrast   • MRI of cervical spine with out contrast, rule cervical stenosis - unable to have completed secondary to kyphosis, per MRI tech   • EEG routine  • Lipid Panel -LDL 90  • Hemoglobin A1c -5 4  • Aspirin 81 mg, with evidence of bilateral carotid stenosis, more notable on the right where there is a prominent ICA origin penetrating Ulcer  • Statin - on pravachol  • TSH and Folate normal  Thiamine pending  • Replete B12 per medicine team recommendations  • Continue telemetry  • PT/OT/ST  • Fall precautions  • Frequent neuro checks  Continue to monitor and notify neurology with any changes  • Vascular consultation, please see consultation appreciate there recommendations  • Recommend patient following with a movement and memory specialist as an out patient  • As reviewed with Dr Abdirashid Mariee attending, referral placed to neurosurgery [facility] clinic  - Medical management and supportive care per primary team  Correction of any metabolic or infectious disturbances

## 2023-03-28 NOTE — H&P
[facility]  H&P  Name: Mike Betancourt  MRN: [id number]  Unit/Bed#: E4 -41 I Date of Admission: 3/27/2023   Date of Service: 3/28/2023 I Hospital Day: 1      Assessment/Plan   * Stroke-like symptom  Assessment & Plan  Arrives for evaluation of difficulty walking, leg weakness  He states that he does have baseline difficulty and uses a cane, difficult walking on surfaces that change elevation, states he has a mental block and has difficulty lifting leg  Denies HA, vision changes, tongue midline, face symmetric, UE strength/sensory intact, LE strength/sensory intact, no speech changes    Plan:  • Assign to observation for TIA/CVA r/o   • VS: hypertensive on admission which is resolving, afebrile, on room air  • CBC without leukocytosis or anemia, CMP at baseline renal function, troponin negative, UA unremarkable  • CTA head/neck: no acute intracranial abnormality, no large vessel occlusion  Bilateral cervical carotid atherosclerotic disease more notable on R where there is a prominent ICA origin penetrating ulcer, no hemodynamically significant stenosis, stable generalized atrophy temporal/central predominant, mild disproportionate ventriculomegaly to sulcal prominence  • Check lipid panel, a1c, b12/folate  • MRI, TTE pending  • Consults: neurology, PT/OT    Ambulatory dysfunction  Assessment & Plan  Reports baseline use of cane, he is okay walking on flat surfaces, difficulty with surface changes  PT/OT evaluation       VTE Pharmacologic Prophylaxis: VTE Score: 4 Moderate Risk (Score 3-4) - Pharmacological DVT Prophylaxis Ordered: enoxaparin (Lovenox)  Code Status: Level 3 - DNAR and DNI   Discussion with family: Update in AM      Anticipated Length of Stay: Patient will be admitted on an observation basis with an anticipated length of stay of less than 2 midnights secondary to [place]      Total Time Spent on Date of Encounter in care of patient: 65 minutes This time was spent on one or more of the following: performing physical exam; counseling and coordination of care; obtaining or reviewing history; documenting in the medical record; reviewing/ordering tests, medications or procedures; communicating with other healthcare professionals and discussing with patient's family/caregivers  Chief Complaint: difficulty walking    History of Present Illness:  Taylor Bowling is a 81 y o  male with a PMH of HTN, HLD, BPH who presents with difficulty walking  Patient states that he typically walks with a cane and does okay on flat surfaces  He was walking today where the ground had elevation changes and he was unable to get around this surface  He states he feels that he has a mental block and cannot elevate his legs high enough to get over the surface  He describes some leg weakness associated with this which was new  On arrival to the ED this has since resolved  He denies HA, vision changes, speech changes, upper/lower extremity weakness, balance difficulty  Review of Systems:  Review of Systems   Constitutional: Negative for chills and fever  HENT: Negative for ear pain and sore throat  Eyes: Negative for pain and visual disturbance  Respiratory: Negative for cough and shortness of breath  Cardiovascular: Negative for chest pain and palpitations  Gastrointestinal: Negative for abdominal pain and vomiting  Genitourinary: Negative for dysuria and hematuria  Musculoskeletal: Negative for arthralgias and back pain  Skin: Negative for color change and rash  Neurological: Negative for seizures and syncope  All other systems reviewed and are negative        Past Medical and Surgical History:   Past Medical History:   Diagnosis Date   • Allergic rhinitis    • BPH with obstruction/lower urinary tract symptoms 2016   • Eye disorder    • Frequency of urination 2015   • Gout    • H/O gastroesophageal reflux (GERD)    • Hypercholesteremia    • Hypertension    • Incomplete bladder emptying 2016   • UTI (urinary tract infection) 2015       Past Surgical History:   Procedure Laterality Date   • CATARACT EXTRACTION, BILATERAL     • COLONOSCOPY  2002   • TONSILLECTOMY  1942       Meds/Allergies:  Prior to Admission medications    Medication Sig Start Date End Date Taking? Authorizing Provider   budesonide (Pulmicort Flexhaler) 180 MCG/ACT inhaler Inhale 4 puffs 2 (two) times a day for 7 days Rinse mouth after use  10/16/22 10/23/22  Nando Gonzalez PA-C   Cholecalciferol ([employer] VITAMIN D3) 1000 units capsule Take by mouth    Historical Provider, MD   cholecalciferol (VITAMIN D3) 1,000 units tablet Take 2 tablets (2,000 Units total) by mouth daily for 14 days 10/16/22 10/30/22  Nando Gonzalez PA-C   colchicine (COLCRYS) 0 6 mg tablet Take 0 6 mg by mouth daily    Historical Provider, MD   docusate sodium (COLACE) 100 mg capsule Take 1 capsule (100 mg total) by mouth every 12 (twelve) hours 7/6/21   Smitha Christianson MD   famotidine (PEPCID) 40 MG tablet Take 40 mg by mouth    Historical Provider, MD   fluticasone (VERAMYST) 27 5 MCG/SPRAY nasal spray into each nostril    Historical Provider, MD   furosemide (LASIX) 40 mg tablet Take 40 mg by mouth    Historical Provider, MD   hydrocortisone-pramoxine (PROCTOFOAM-HC) 1-1 % FOAM rectal foam Insert 1 applicator into the rectum 2 (two) times a day 7/6/21   Smitha Christianson MD   loratadine (CLARITIN) 10 mg tablet Take 10 mg by mouth daily    Historical Provider, MD   losartan (COZAAR) 100 MG tablet Take 100 mg by mouth daily    Historical Provider, MD   potassium chloride (K-DUR,KLOR-CON) 20 mEq tablet Take 20 mEq by mouth    Historical Provider, MD   simvastatin (ZOCOR) 40 mg tablet Take 40 mg by mouth    Historical Provider, MD   tamsulosin (FLOMAX) 0 4 mg Take 1 capsule (0 4 mg total) by mouth daily with dinner 3/24/22   YAMILA Raygoza     I have reviewed home medications with patient personally  Allergies:    Allergies   Allergen Reactions   • Penicillins Other (See Comments)     Pt is unsure if allergic  Social History:  Marital Status: Single   Occupation:   Patient Pre-hospital Living Situation: Home  Patient Pre-hospital Level of Mobility: walks with cane  Patient Pre-hospital Diet Restrictions:   Substance Use History:   Social History     Substance and Sexual Activity   Alcohol Use Yes   • Alcohol/week: 2 0 standard drinks   • Types: 2 Glasses of wine per week     Social History     Tobacco Use   Smoking Status Never   Smokeless Tobacco Never     Social History     Substance and Sexual Activity   Drug Use No       Family History:  Family History   Problem Relation Age of Onset   • Heart failure Mother    • Heart attack Father    • Hypertension Father        Physical Exam:     Vitals:   Blood Pressure: (!) 171/75 (03/28/23 0000)  Pulse: 72 (03/28/23 0000)  Temperature: 99 6 °F (37 6 °C) (03/28/23 0000)  Temp Source: Temporal (03/28/23 0000)  Respirations: 18 (03/28/23 0000)  SpO2: 98 % (03/28/23 0000)    Physical Exam  Vitals and nursing note reviewed  Constitutional:       General: He is not in acute distress  Appearance: He is well-developed  HENT:      Head: Normocephalic and atraumatic  Eyes:      Conjunctiva/sclera: Conjunctivae normal    Cardiovascular:      Rate and Rhythm: Normal rate and regular rhythm  Heart sounds: No murmur heard  Pulmonary:      Effort: Pulmonary effort is normal  No respiratory distress  Breath sounds: Normal breath sounds  Abdominal:      Palpations: Abdomen is soft  Tenderness: There is no abdominal tenderness  Musculoskeletal:         General: No swelling  Cervical back: Neck supple  Skin:     General: Skin is warm and dry  Capillary Refill: Capillary refill takes less than 2 seconds  Neurological:      Mental Status: He is alert and oriented to person, place, and time  Cranial Nerves: No cranial nerve deficit  Motor: No weakness  Coordination: Coordination normal    Psychiatric:         Mood and Affect: Mood normal           Additional Data:     Lab Results:  Results from last 7 days   Lab Units 03/27/23  1559   WBC Thousand/uL 7 25   HEMOGLOBIN g/dL 12 1   HEMATOCRIT % 37 0   PLATELETS Thousands/uL 217   NEUTROS PCT % 81*   LYMPHS PCT % 14   MONOS PCT % 5   EOS PCT % 0     Results from last 7 days   Lab Units 03/27/23  1559   SODIUM mmol/L 141   POTASSIUM mmol/L 4 2   CHLORIDE mmol/L 103   CO2 mmol/L 31   BUN mg/dL 20   CREATININE mg/dL 1 36*   ANION GAP mmol/L 7   CALCIUM mg/dL 9 8   ALBUMIN g/dL 4 2   TOTAL BILIRUBIN mg/dL 0 55   ALK PHOS U/L 65   ALT U/L 9   AST U/L 11*   GLUCOSE RANDOM mg/dL 116                       Lines/Drains:  Invasive Devices     Peripheral Intravenous Line  Duration           Peripheral IV 03/27/23 Left Antecubital <1 day                    Imaging: Reviewed radiology reports from this admission including: CT head and Personally reviewed the following imaging: CT head  CTA head and neck with and without contrast   Final Result by Jaime López MD (03/27 1945)      No acute intracranial abnormality  No cervical or intracranial large vessel occlusion  Bilateral cervical carotid atherosclerotic disease, more notable on the right where there is a prominent ICA origin penetrating ulcer  No hemodynamically significant carotid stenosis  Stable generalized atrophy, somewhat temporal and central predominant, with mildly disproportionate ventriculomegaly to sulcal prominence  In the appropriate clinical setting, consider NPH  Workstation performed: [id number]         XR chest 1 view portable   Final Result by Twin Meyer MD (03/27 6213)      No acute cardiopulmonary disease  Workstation performed: [id number]         MRI Inpatient Order    (Results Pending)       EKG and Other Studies Reviewed on Admission:   · EKG: No EKG obtained      ** Please Note: This note has been constructed using a voice recognition system   **

## 2023-03-28 NOTE — OCCUPATIONAL THERAPY NOTE
"    Occupational Therapy Evaluation(time=1497-1087)     Patient Name: Yolanda Elaine  Today's Date: 3/28/2023  Problem List  Principal Problem:    Stroke-like symptom  Active Problems:    Ambulatory dysfunction    Past Medical History  Past Medical History:   Diagnosis Date    Allergic rhinitis     BPH with obstruction/lower urinary tract symptoms 2016    Eye disorder     Frequency of urination 2015    Gout     H/O gastroesophageal reflux (GERD)     Hypercholesteremia     Hypertension     Incomplete bladder emptying 2016    UTI (urinary tract infection) 2015     Past Surgical History  Past Surgical History:   Procedure Laterality Date    CATARACT EXTRACTION, BILATERAL      COLONOSCOPY  2002    TONSILLECTOMY  1942 03/28/23 1050   Note Type   Note type Evaluation   Pain Assessment   Pain Assessment Tool 0-10   Pain Score No Pain   Restrictions/Precautions   Weight Bearing Precautions Per Order No   Other Precautions Fall Risk   Home Living   Type of Home Apartment  ([address] [place] [address])   Home Layout One level;Elevator   Bathroom Shower/Tub Tub/shower unit   Bathroom Toilet Standard   Bathroom Equipment Grab bars in [facility]   Prior Function   Lives With [address] in the last 6 months 0   Lifestyle   Autonomy PTA pt states independence with his ADLs, transfers, and ambulation--with QC; neg falls, +   Reciprocal Relationships sister, niece   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   Intrinsic Gratification wordgames   Subjective   Subjective \"I don't think I should drive anymore  \"   ADL   Where Assessed Edge of bed   Eating Assistance 5  Supervision/Setup   Grooming Assistance 5  Supervision/Setup   UB Bathing Assistance 5  Supervision/Setup   LB Bathing Assistance 4  Minimal Assistance   UB Dressing Assistance 5  Supervision/Setup   LB Dressing Assistance 4  Minimal Assistance   Bed Mobility   Rolling R 5  Supervision   Rolling L 5  Supervision   Supine to Sit 5 " Supervision   Transfers   Sit to Stand 5  Supervision   Additional items Increased time required;Verbal cues   Stand to Sit 5  Supervision   Additional items Increased time required;Verbal cues   Functional Mobility   Functional Mobility 4  Minimal assistance   Additional Comments x1   Additional items   (with QC, supervision with RW)   Balance   Static Sitting Fair +   Dynamic Sitting Fair   Static Standing Fair   Dynamic Standing Fair -   Activity Tolerance   Activity Tolerance Patient tolerated treatment well   Medical Staff Made Aware nsg, P T    RUE Assessment   RUE Assessment WFL   RUE Strength   RUE Overall Strength Within Functional Limits - able to perform ADL tasks with strength  (4/5 throughout)   LUE Assessment   LUE Assessment WFL   LUE Strength   LUE Overall Strength Within Functional Limits - able to perform ADL tasks with strength  (4/5 throughout)   Hand Function   Gross Motor Coordination Functional   Fine Motor Coordination Functional   Sensation   Light Touch No apparent deficits   Proprioception   Proprioception No apparent deficits   Vision-Basic Assessment   Current Vision   (glasses)   Vision - Complex Assessment   Visual Fields   (able to scan visual fields)   Psychosocial   Psychosocial (WDL) X   Patient Behaviors/Mood Flat affect; Cooperative   Perception   Inattention/Neglect Appears intact   Cognition   Overall Cognitive Status Impaired   Arousal/Participation Alert   Attention Attends with cues to redirect   Orientation Level Oriented X4   Memory Decreased short term memory;Decreased recall of precautions   Following Commands Follows one step commands with increased time or repetition   Assessment   Limitation Decreased ADL status; Decreased UE strength;Decreased Safe judgement during ADL;Decreased cognition;Decreased endurance;Decreased high-level ADLs   Prognosis Fair   Assessment Pt is a 86y/o male admitted to the hospital 2* symptoms of decreased ability to ambulate, LE weakness; "CTA(brain)=neg acute findings  Pt with PMH BPH, eye disorder, gout, HTN, UTI  PTA pt states independence with his ADLs, transfers, and ambulation--with QC; neg falls, +  During initial eval, pt demonstrated deficits with his functional balance, functional mobility, ADL status, transfer safety, b/l UE strength, activity tolerance(currently fair=15-20mins), and cognition(i e memory)  Pt would benefit from continued OT tx for the above deficits  2-3xwk/1-2wks  The patient's raw score on the AM-PAC Daily Activity Inpatient Short Form is 21  A raw score of greater than or equal to 19 suggests the patient may benefit from discharge to home  Please refer to the recommendation of the Occupational Therapist for safe discharge planning  Goals   Patient Goals \"to get better\"   STG Time Frame   (1-7 days)   Short Term Goal #1 Pt will demonstrate improved activity tolerance to good(20-30mins) and standing tolerance to 3-5mins to assist with ADLs  Short Term Goal #2 Pt will demonstrate mod I with their sit-stand transfers to assist with completion of their LE dressing  Short Term Goal  Pt will demonstrate proper walker/transfer safety 100% of the time  LTG Time Frame   (7-14 days)   Long Term Goal #1 Pt will demonstrate g/g- balance with all functional activities  Long Term Goal #2 Pt will demonstrate mod I with their UE and LE bathing/dresssing  Long Term Goal Pt will tolerate continued cognitive/home-safety assessment and appropriate d/c recommendations will be provided  Plan   Treatment Interventions ADL retraining;Functional transfer training;UE strengthening/ROM; Endurance training;Cognitive reorientation;Patient/family training; Compensatory technique education;Continued evaluation   Goal Expiration Date 04/11/23   OT Treatment Day 0   OT Frequency 2-3x/wk   Recommendation   OT Discharge Recommendation Home with home health rehabilitation   AM-[facility] Daily Activity Inpatient   Lower Body Dressing 3   Bathing 3 " Toileting 3   Upper Body Dressing 4   Grooming 4   Eating 4   Daily Activity Raw Score 21   Daily Activity Standardized Score (Calc for Raw Score >=11) 44 27   AM-PAC Applied Cognition Inpatient   Following a Speech/Presentation 4   Understanding Ordinary Conversation 4   Taking Medications 4   Remembering Where Things Are Placed or Put Away 3   Remembering List of 4-5 Errands 3   Taking Care of Complicated Tasks 3   Applied Cognition Raw Score 21   Applied Cognition Standardized Score 44 3   Elysia Espana

## 2023-03-28 NOTE — PROGRESS NOTES
[address] [place] [address]  Progress Note  Name: Moshe Saxena  MRN: [id number]  Unit/Bed#: E4 -03 I Date of Admission: 3/27/2023   Date of Service: 3/28/2023 I Hospital Day: 1    Assessment/Plan   B12 deficiency  Assessment & Plan  Start B12     Ambulatory dysfunction  Assessment & Plan  Reports baseline use of cane, he is okay walking on flat surfaces, difficulty with surface changes  PT/OT evaluation      * Stroke-like symptom  Assessment & Plan  • Arrives for evaluation of difficulty walking, leg weakness  • Neurology evaluation placed  • B12 to be repleted  • MRI Pending  • PT/OT  • Evidence of carotid artery stenosis, will need OP follow up  If MRI positive will need to discuss potential CEA           [facility] Progress Note  Patient: Moshe Saxena 81 y o  male   MRN: [id number]  PCP: Kevin Martin DO  Unit/Bed#: E4 -02 Encounter: [id number]  Date Of Visit: 03/28/23    Assessment:    Principal Problem:    Stroke-like symptom  Active Problems:    Ambulatory dysfunction    B12 deficiency      Plan:    · Vascular surgery consultation  · MRI       VTE Pharmacologic Prophylaxis:   Pharmacologic: Enoxaparin (Lovenox)  Mechanical VTE Prophylaxis in Place: Yes    Patient Centered Rounds: I have performed bedside rounds with nursing staff today  Discussions with Specialists or Other Care Team Provider: Discussed with case management    Education and Discussions with Family / Patient: Patient family    Time Spent for Care: 1 hour  More than 50% of total time spent on counseling and coordination of care as described above      Current Length of Stay: 1 day(s)    Current Patient Status: Inpatient   Certification Statement: The patient will continue to require additional inpatient hospital stay due to Neuroimaging, valuation of stroke    Discharge Plan / Estimated Discharge Date: Tomorrow    Code Status: Level 3 - DNAR and DNI      Subjective:   Seen and examined, no acute complaints  No chest pain or shortness of breath no abdominal pain    A complete and comprehensive 14 point organ system review has been performed and all other systems are negative other than stated above  Objective:     Vitals:   Temp (24hrs), Av 9 °F (37 2 °C), Min:97 9 °F (36 6 °C), Max:99 6 °F (37 6 °C)    Temp:  [97 9 °F (36 6 °C)-99 6 °F (37 6 °C)] 98 7 °F (37 1 °C)  HR:  [58-89] 83  Resp:  [16-18] 18  BP: (113-196)/(57-81) 113/70  SpO2:  [95 %-99 %] 97 %  Body mass index is 24 82 kg/m²  Input and Output Summary (last 24 hours):        Intake/Output Summary (Last 24 hours) at 3/28/2023 1628  Last data filed at 3/28/2023 1628  Gross per 24 hour   Intake 570 ml   Output 280 ml   Net 290 ml       Physical Exam:     General: well appearing, no acute distress  HEENT: atraumatic, PERRLA, moist mucosa, normal pharynx, normal tonsils and adenoids, normal tongue, no fluid in sinuses  Neck: Trachea midline, no carotid bruit, no masses  Respiratory: normal chest wall expansion, CTA B, no r/r/w, no rubs  Cardiovascular: RRR, no m/r/g, Normal S1 and S2  Abdomen: Soft, non-tender, non-distended, normal bowel sounds in all quadrants, no hepatosplenomegaly, no tympany  Rectal: deferred  Musculoskeletal: normal ROM in upper and lower extremities  Integumentary: warm, dry, and pink, with no rash, purpura, or petechia  Heme/Lymph: no lymphadenopathy, no bruises  Neurological: Cranial Nerves II-XII grossly intact, no tics, normal sensation to pressure and light touch  Psychiatric: cooperative with normal mood, affect, and cognition      Additional Data:     Labs:    Results from last 7 days   Lab Units 23  0514 23  1559   WBC Thousand/uL 7 89 7 25   HEMOGLOBIN g/dL 12 1 12 1   HEMATOCRIT % 37 2 37 0   PLATELETS Thousands/uL 223 217   NEUTROS PCT %  --  81*   LYMPHS PCT %  --  14   MONOS PCT %  --  5   EOS PCT %  --  0     Results from last 7 days   Lab Units 23  0514 23  1552 POTASSIUM mmol/L 3 7 4 2   CHLORIDE mmol/L 106 103   CO2 mmol/L 29 31   BUN mg/dL 17 20   CREATININE mg/dL 1 15 1 36*   CALCIUM mg/dL 9 3 9 8   ALK PHOS U/L  --  65   ALT U/L  --  9   AST U/L  --  11*           * I Have Reviewed All Lab Data Listed Above  * Additional Pertinent Lab Tests Reviewed: Chung 67 Admission Reviewed    Imaging:    Imaging Reports Reviewed Today Include: CTA  Imaging Personally Reviewed by Myself Includes: CTA    Recent Cultures (last 7 days):           Last 24 Hours Medication List:   Current Facility-Administered Medications   Medication Dose Route Frequency Provider Last Rate   • acetaminophen  650 mg Oral Q4H PRN Cailin Frey PA-C     • aluminum-magnesium hydroxide-simethicone  30 mL Oral Q6H PRN Cailin Frey PA-C     • aspirin  81 mg Oral Daily Cailin Frey PA-C     • colchicine  0 6 mg Oral Daily Cailin Frey PA-C     • cyanocobalamin  1,000 mcg Oral Daily Neel Zheng DO     • cyanocobalamin  1,000 mcg Intramuscular Q30 Days Tde García DO     • docusate sodium  100 mg Oral BID Cailin Frey PA-C     • enoxaparin  40 mg Subcutaneous Daily Cailin Frey PA-C     • famotidine  20 mg Oral Daily Cailin Frey PA-C     • furosemide  40 mg Oral Daily Cailin Frey PA-C     • loratadine  10 mg Oral Daily Cailin Frey PA-C     • losartan  100 mg Oral Daily Cailin Frey PA-C     • ondansetron  4 mg Intravenous Q6H PRN Cailin Frey PA-C     • pravastatin  80 mg Oral Daily With Alicia Michelle PA-C     • tamsulosin  0 4 mg Oral Daily With Dinner Cailin Frey PA-C          Today, Patient Was Seen By: Ted García DO    ** Please Note: This note was completed in part utilizing [email or web address] Direct Software  Grammatical errors, random word insertions, spelling mistakes, and incomplete sentences may be an occasional consequence of this system secondary to software limitations, ambient noise, and hardware issues    If you have any questions or concerns about the content, text, or information contained within the body of this dictation, please contact the provider for clarification   **

## 2023-03-28 NOTE — ASSESSMENT & PLAN NOTE
Is This A New Presentation, Or A Follow-Up?: Phototherapy Treatment Reports baseline use of cane, he is okay walking on flat surfaces, difficulty with surface changes  PT/OT evaluation

## 2023-03-28 NOTE — CONSULTS
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  Hgb 12 1  WBC 7 89  SCr/eGFR 1 15/57     Plan:  - Presumed asymptomatic mild bilateral carotid stenosis with possible penetrating ulceration at the bifurcation of the R ICA  - CTA of the head and neck reviewed  No evidence of flow limiting stenosis or severe disease  - D/w neurology- patient is presumed to be asymptomatic at this time  MRI is ordered and pending  - Will obtain carotid duplex for formal evaluation  - No indication for surgical intervention at this time  - Recommend medical management with ASA and statin   - Outpatient follow up with our office for surveillance and establishment of care   - Please call with any concerns or questions  - D/w Neurology  - Will d/w Dr Efren Dunbar    History of Present Illness   Physician Requesting Consult: Jo Ann Gamez DO     HPI: Izabeal Granados is a 81y o  year old male nonsmoker w/HTN, HLD, BPH, ambulatory dysfunction, presents with bilateral lower extremity weakness and trouble walking  CTA of the head and neck was ordered which demonstrates 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  Patient has been having difficulty with ambulation  He feels that this has gotten worse  He denies any change in vision, upper extremity weakness, or unilateral/localizing symptoms  Patient states that he would not want carotid intervention if indicated       Inpatient consult to Vascular Surgery  Consult performed by: Chun Sierra PA-C  Consult ordered by: Nadira Hernandez PA-C          Review of Systems   Constitutional: Negative  HENT: Negative  Eyes: Negative  Respiratory: Negative  Cardiovascular: Negative  Gastrointestinal: Negative  Endocrine: Negative  Genitourinary: Negative  Musculoskeletal: Positive for gait problem  Skin: Negative  Allergic/Immunologic: Negative  Neurological: Positive for weakness  Hematological: Negative  Psychiatric/Behavioral: Negative          Historical Information   Past Medical History:   Diagnosis Date   • Allergic rhinitis    • BPH with obstruction/lower urinary tract symptoms 2016   • Eye disorder    • Frequency of urination 2015   • Gout    • H/O gastroesophageal reflux (GERD)    • Hypercholesteremia    • Hypertension    • Incomplete bladder emptying 2016   • UTI (urinary tract infection) 2015     Past Surgical History:   Procedure Laterality Date   • CATARACT EXTRACTION, BILATERAL     • COLONOSCOPY  2002   • TONSILLECTOMY  1942     Social History   Social History     Substance and Sexual Activity   Alcohol Use Yes   • Alcohol/week: 2 0 standard drinks   • Types: 2 Glasses of wine per week     Social History     Substance and Sexual Activity   Drug Use No     E-Cigarette/Vaping     E-Cigarette/Vaping Substances     Social History     Tobacco Use   Smoking Status Never   Smokeless Tobacco Never     Family History:   Family History   Problem Relation Age of Onset   • Heart failure Mother    • Heart attack Father    • Hypertension Father    }    Meds/Allergies   current meds:   Current Facility-Administered Medications   Medication Dose Route Frequency   • acetaminophen (TYLENOL) tablet 650 mg  650 mg Oral Q4H PRN   • aluminum-magnesium hydroxide-simethicone (MYLANTA) oral suspension 30 mL  30 mL Oral Q6H PRN   • aspirin chewable tablet 81 mg  81 mg Oral Daily   • colchicine (COLCRYS) tablet 0 6 mg  0 6 mg Oral Daily   • cyanocobalamin (VITAMIN B-12) tablet 1,000 mcg  1,000 mcg Oral Daily   • cyanocobalamin injection 1,000 mcg  1,000 mcg Intramuscular Q30 Days "  • docusate sodium (COLACE) capsule 100 mg  100 mg Oral BID   • enoxaparin (LOVENOX) subcutaneous injection 40 mg  40 mg Subcutaneous Daily   • famotidine (PEPCID) tablet 20 mg  20 mg Oral Daily   • furosemide (LASIX) tablet 40 mg  40 mg Oral Daily   • loratadine (CLARITIN) tablet 10 mg  10 mg Oral Daily   • losartan (COZAAR) tablet 100 mg  100 mg Oral Daily   • ondansetron (ZOFRAN) injection 4 mg  4 mg Intravenous Q6H PRN   • pravastatin (PRAVACHOL) tablet 80 mg  80 mg Oral Daily With Dinner   • tamsulosin (FLOMAX) capsule 0 4 mg  0 4 mg Oral Daily With Dinner     Allergies   Allergen Reactions   • Penicillins Other (See Comments)     Pt is unsure if allergic  Objective   Vitals: Blood pressure 142/66, pulse 70, temperature 99 °F (37 2 °C), temperature source Temporal, resp  rate 18, height 5' 10\" (1 778 m), weight 78 5 kg (173 lb), SpO2 96 %  ,Body mass index is 24 82 kg/m²  Intake/Output Summary (Last 24 hours) at 3/28/2023 1219  Last data filed at 3/28/2023 1019  Gross per 24 hour   Intake 570 ml   Output 230 ml   Net 340 ml     Invasive Devices     Peripheral Intravenous Line  Duration           Peripheral IV 03/27/23 Left Antecubital <1 day                Physical Exam  Constitutional:       General: He is not in acute distress  Appearance: Normal appearance  He is not ill-appearing, toxic-appearing or diaphoretic  HENT:      Head: Normocephalic and atraumatic  Right Ear: External ear normal       Left Ear: External ear normal       Nose: Nose normal       Mouth/Throat:      Mouth: Mucous membranes are moist       Pharynx: Oropharynx is clear  Eyes:      General: No scleral icterus  Extraocular Movements: Extraocular movements intact  Conjunctiva/sclera: Conjunctivae normal    Cardiovascular:      Rate and Rhythm: Normal rate  Pulmonary:      Effort: Pulmonary effort is normal  No respiratory distress  Abdominal:      General: Abdomen is flat        Palpations: Abdomen is " soft    Musculoskeletal:         General: Normal range of motion  Cervical back: Normal range of motion and neck supple  Right lower leg: No edema  Left lower leg: No edema  Skin:     General: Skin is warm and dry  Capillary Refill: Capillary refill takes less than 2 seconds  Neurological:      General: No focal deficit present  Mental Status: He is alert and oriented to person, place, and time  Mental status is at baseline  Psychiatric:         Mood and Affect: Mood normal          Behavior: Behavior normal          Lab Results:   Coags: No results found for: PT, PTT, INR, Creatinine:   Lab Results   Component Value Date    CREATININE 1 15 03/28/2023   , Lipid Panel: No results found for: CHOL, CBC with diff:   Lab Results   Component Value Date    WBC 7 89 03/28/2023    HGB 12 1 03/28/2023    HCT 37 2 03/28/2023    MCV 99 (H) 03/28/2023     03/28/2023    MCH 32 3 03/28/2023    MCHC 32 5 03/28/2023    RDW 13 3 03/28/2023    MPV 10 2 03/28/2023    NRBC 0 03/27/2023     Imaging Studies: I have personally reviewed pertinent reports  EKG, Pathology, and Other Studies: I have personally reviewed pertinent reports      VTE Prophylaxis: Sequential compression device Cristy Allen)      Code Status: Level 3 - DNAR and DNI  Advance Directive and Living Will:      Power of :    POLST:      Counseling / Coordination of Care  None

## 2023-03-28 NOTE — ASSESSMENT & PLAN NOTE
Reports baseline use of cane, he is okay walking on flat surfaces, difficulty with surface changes  PT/OT evaluation

## 2023-03-28 NOTE — PHYSICAL THERAPY NOTE
PT EVALUATION 10:24-10:40  Treat: 10:40-10:52    81 y o     [id number]    Difficulty walking [R26 2]  Leg weakness [R29 898]    Past Medical History:   Diagnosis Date    Allergic rhinitis     BPH with obstruction/lower urinary tract symptoms 2016    Eye disorder     Frequency of urination 2015    Gout     H/O gastroesophageal reflux (GERD)     Hypercholesteremia     Hypertension     Incomplete bladder emptying 2016    UTI (urinary tract infection) 2015         Past Surgical History:   Procedure Laterality Date    CATARACT EXTRACTION, BILATERAL      COLONOSCOPY  2002    TONSILLECTOMY  1942 03/28/23 1024   PT Last Visit   PT Visit Date 03/28/23   Note Type   Note type Evaluation  (and treatment)   Pain Assessment   Pain Score No Pain   Restrictions/Precautions   Weight Bearing Precautions Per Order No   Other Precautions Fall Risk;Cognitive   Home Living   Type of Home Apartment   Home Layout One level;Elevator   Bathroom Shower/Tub Tub/shower unit   Bathroom Toilet Standard   Bathroom Equipment Grab bars in shower   [address] Quad cane   Additional Comments resides alone in apt  Prior Function   Level of [place] Independent with ADLs; Independent with functional mobility; Independent with IADLS   Lives With Alone   Receives Help From Friend(s)   IADLs Independent with driving; Independent with meal prep; Independent with medication management   Falls in the last 6 months 0   Vocational Retired   Comments Reports independent with use of quad cane  If to grocery store relies on cart to push to walk  General   Additional Pertinent History Pt is 82 y/o male admitted with weakness and difficulty ambulating     Family/Caregiver Present No   Cognition   Overall Cognitive Status Impaired   Attention Attends with cues to redirect   Orientation Level Oriented X4   Following Commands Follows one step commands with increased time or repetition   Comments ? insight into impairments  Frequent redirection to questions  Subjective   Subjective Doing ok  Reports using a QC for walking, cant walk without  RUE Assessment   RUE Assessment WFL  (as observed with functional reach and grasp)   LUE Assessment   LUE Assessment WFL  (as observed with functional reach and grasp )   RLE Assessment   RLE Assessment WFL   Strength RLE   RLE Overall Strength 4-/5   LLE Assessment   LLE Assessment WFL   Strength LLE   LLE Overall Strength 4-/5   Light Touch   RLE Light Touch Grossly intact   LLE Light Touch Grossly intact   Proprioception   RLE Proprioception Grossly intact   LLE Proprioception Grossly Intact   Bed Mobility   Supine to Sit 5  Supervision   Additional items HOB elevated   Transfers   Sit to Stand 5  Supervision   Additional items Increased time required;Verbal cues  (cues for hand placement )   Stand to Sit 5  Supervision   Additional items Verbal cues  (cues for hand placement )   Additional Comments cues for hand placement,safety   Ambulation/Elevation   Gait pattern Decreased foot clearance; Improper Weight shift; Inconsistent hermes; Short stride   Gait Assistance 4  Minimal assist   Additional items Verbal cues; Assist x 1   Assistive Device Small base quad cane   Distance 65'x1  Ambulation/Elevation Additional Comments hand held assist + cane needed to maintain stability  Balance   Static Sitting Fair +   Dynamic Sitting Fair   Static Standing Fair   Dynamic Standing Poor +   Ambulatory Poor +   Endurance Deficit   Endurance Deficit Yes   Endurance Deficit Description fatigue   Activity Tolerance   Activity Tolerance Patient tolerated treatment well;Patient limited by fatigue   Medical Staff Made Aware Nurse, Katarina Jung:Pt seen for co-evaluation/treatment with skilled Occupational Therapist 2* clinically unstable/unpredictable presentation, medical complexity, fall risk, cognitive impairments, functional/physical limitations, impaired functional balance, decreased safety awareness, limited activity tolerance which is decline from PLOF and may impact overall functional mobility/mobility safety  Nurse Made Aware yes   Assessment   Prognosis Good   Problem List Decreased strength;Decreased endurance; Impaired balance;Decreased mobility; Impaired judgement;Decreased safety awareness   Assessment Leigha Sutherland is a 81 y o  male with a PMH of HTN, HLD, BPH who presents with difficulty walking  CTA of the head and neck was ordered which demonstrates 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  Pending MRI  PT consulted  Ambulate orders  Prior to admission resides alone in apt with elevator access  Independent with use of QC prior to admission  Currently presents with functional limitations related to decreased activity tolerance, strength, balance, functional mobility and locomotion  S for bed mobility and transfers  [place] for ambulation using QC and hand held assistance  Gait slowed, unsteady  Will benefit from skilled PT intervention in order to optimize outcomes, facilitating return to independence  The patient's AM-PAC Basic Mobility Inpatient Short Form Raw Score is 21  A Raw score of greater than 16 suggests the patient may benefit from discharge to home  Please also refer to the recommendation of the Physical Therapist for safe discharge planning  Anticipate home with PT upon d/c  See treatment note for gait training with RW   RW recommended for ambulation  Goals   Patient Goals get better   STG Expiration Date 04/07/23   Short Term Goal #1 10 days: 1)  Pt will perform bed mobility with Yonas demonstrating appropriate technique 100% of the time in order to improve function  2)  Perform all transfers with Yonas demonstrating safe and appropriate technique 100% of the time in order to improve ability to negotiate safely in home environment  3) Amb with least restrictive AD > 150'x1 with mod I in order to demonstrate ability to negotiate in home environment  4)  Improve overall strength and balance 1/2 grade in order to optimize ability to perform functional tasks and reduce fall risk  5) Increase activity tolerance to 30 minutes in order to improve endurance to functional tasks  6) PT for ongoing patient and family/caregiver education, DME needs and d/c planning in order to promote highest level of function in least restrictive environment  Plan   Treatment/Interventions Functional transfer training;LE strengthening/ROM; Therapeutic exercise; Endurance training;Patient/family training;Equipment eval/education; Bed mobility;Gait training; Compensatory technique education;Continued evaluation;Spoke to nursing;OT   PT Frequency 3-5x/wk   Recommendation   PT Discharge Recommendation Home with home health rehabilitation   Equipment Recommended [place] walker   AM-PAC Basic Mobility Inpatient   Turning in Flat Bed Without Bedrails 4   Lying on Back to Sitting on Edge of Flat Bed Without Bedrails 4   Moving Bed to Chair 3   Standing Up From Chair Using Arms 4   Walk in Room 3   Climb 3-5 Stairs With Railing 3   Basic Mobility Inpatient Raw Score 21   Basic Mobility Standardized Score 45 55   Highest Level Of Mobility   JH-HLM Goal 6: Walk 10 steps or more   JH-HLM Achieved 7: Walk 25 feet or more   Additional Treatment Session   Start Time 1040   End Time 1052   Treatment Assessment Seen for gait training with RW support  Progressed to S with use of RW for ambulation for 65 ft  Improved pace, fluency and able to ambulate at S with same  Rec RW for home use  Equipment Use RW   Additional Treatment Day 1   End of Consult   Patient Position at End of Consult Bedside chair;Bed/Chair alarm activated; All needs within reach     Hx/personal factors: co-morbidities, dec caregiver support, home alone, advanced age, telemetry, use of AD, dec cognition, and fall risk  Examination: dec mobility, dec balance, dec endurance, dec amb, risk for falls, dec cognition, assessed body system, balance, endurance, amb, D/C disposition & fall risk  Clinical: unpredictable (ongoing medical status, risk for falls, imaging test/result pending, and consult pending)  Complexity: high           Shelbi Shiela PT

## 2023-03-29 ENCOUNTER — APPOINTMENT (INPATIENT)
Dept: MRI IMAGING | Facility: HOSPITAL | Age: 88
End: 2023-03-29

## 2023-03-29 ENCOUNTER — TELEPHONE (OUTPATIENT)
Dept: OTHER | Facility: HOSPITAL | Age: 88
End: 2023-03-29

## 2023-03-29 ENCOUNTER — APPOINTMENT (INPATIENT)
Dept: NEUROLOGY | Facility: HOSPITAL | Age: 88
End: 2023-03-29

## 2023-03-29 DIAGNOSIS — N39.0 ACUTE UTI: Primary | ICD-10-CM

## 2023-03-29 PROBLEM — R31.9 HEMATURIA: Status: ACTIVE | Noted: 2023-03-29

## 2023-03-29 LAB
BACTERIA UR QL AUTO: ABNORMAL /HPF
BILIRUB UR QL STRIP: NEGATIVE
CLARITY UR: ABNORMAL
COLOR UR: ABNORMAL
FOLATE SERPL-MCNC: 18.2 NG/ML (ref 3.1–17.5)
GLUCOSE UR STRIP-MCNC: NEGATIVE MG/DL
HGB UR QL STRIP.AUTO: ABNORMAL
KETONES UR STRIP-MCNC: NEGATIVE MG/DL
LEUKOCYTE ESTERASE UR QL STRIP: ABNORMAL
NITRITE UR QL STRIP: POSITIVE
NON-SQ EPI CELLS URNS QL MICRO: ABNORMAL /HPF
PH UR STRIP.AUTO: 7 [PH]
PROT UR STRIP-MCNC: NEGATIVE MG/DL
RBC #/AREA URNS AUTO: ABNORMAL /HPF
SP GR UR STRIP.AUTO: 1.01 (ref 1–1.03)
UROBILINOGEN UR QL STRIP.AUTO: 1 E.U./DL
WBC #/AREA URNS AUTO: ABNORMAL /HPF

## 2023-03-29 RX ADMIN — PRAVASTATIN SODIUM 80 MG: 80 TABLET ORAL at 17:58

## 2023-03-29 RX ADMIN — FUROSEMIDE 40 MG: 40 TABLET ORAL at 09:08

## 2023-03-29 RX ADMIN — TAMSULOSIN HYDROCHLORIDE 0.4 MG: 0.4 CAPSULE ORAL at 17:58

## 2023-03-29 RX ADMIN — LORATADINE 10 MG: 10 TABLET ORAL at 09:07

## 2023-03-29 RX ADMIN — ASPIRIN 81 MG CHEWABLE TABLET 81 MG: 81 TABLET CHEWABLE at 09:07

## 2023-03-29 RX ADMIN — CYANOCOBALAMIN TAB 500 MCG 1000 MCG: 500 TAB at 09:06

## 2023-03-29 RX ADMIN — DOCUSATE SODIUM 100 MG: 100 CAPSULE, LIQUID FILLED ORAL at 09:07

## 2023-03-29 RX ADMIN — DOCUSATE SODIUM 100 MG: 100 CAPSULE, LIQUID FILLED ORAL at 17:58

## 2023-03-29 RX ADMIN — FAMOTIDINE 20 MG: 20 TABLET ORAL at 09:06

## 2023-03-29 RX ADMIN — ENOXAPARIN SODIUM 40 MG: 100 INJECTION SUBCUTANEOUS at 09:08

## 2023-03-29 RX ADMIN — LOSARTAN POTASSIUM 100 MG: 50 TABLET, FILM COATED ORAL at 09:07

## 2023-03-29 NOTE — PROGRESS NOTES
Progress Note - Neurology   Bambi Quick 81 y o  male MRN: [id number]  Unit/Bed#: E4 -02 Encounter: [id number]    Assessment/Plan   Ambulatory dysfunction  Assessment & Plan  The patient is a pleasant 81-year-old male with history of GERD, hypertension, hypercholesteremia, seasonal allergies and BPH who presents with acute ambulatory dysfunction, he froze while walking up a hill  Per record review the patient does not take an antiplatelet medication at home  CTA of the head and neck showed no acute intracranial abnormality, no cervical intracranial large vessel occlusion, however did show bilateral cervical carotid stenosis disease more notable on the right where there is a prominent ICA origin penetrating ulcer  No hemodynamic significant carotid stenosis  Dana generalized atrophy  He was noted to be hypertensive admission, with blood pressures running from 106E to 181D systolic, with his highest blood pressure being 196/81 mmhg  Her blood pressures have now improved  · Ambulatory dysfunction (transient worsening reported which has improved - frozen gait) -by description this does not appear to be a stroke or TIA, the patient was hypertensive on arrival and unclear if this was contributory  This may all be due to deconditioning/there may be features of neuropathy on examination, no evidence on examination suspect myopathy  Subtle parkinsonism features on examination  ? NPH on imaging     · Hypertensive urgency  · Bilateral cervical carotid stenosis more notable on the right where there is a prominent ICA origin penetrating ulcer, no hemodynamic significant carotid stenosis, suspect asymptomatic, MRI of brain pending  · Hyperlipidemia  · B12 deficiency    • MRI brain with out contrast   • MRI of cervical spine with out contrast, rule cervical stenosis - unable to have completed secondary to kyphosis, per MRI tech   • EEG routine  • Lipid Panel -LDL 90  • Hemoglobin A1c "-5 4  • Aspirin 81 mg, with evidence of bilateral carotid stenosis, more notable on the right where there is a prominent ICA origin penetrating Ulcer  • Statin - on pravachol  • TSH and Folate normal  Thiamine pending  • Replete B12 per medicine team recommendations  • Continue telemetry  • PT/OT/ST  • Fall precautions  • Frequent neuro checks  Continue to monitor and notify neurology with any changes  • Vascular consultation, please see consultation appreciate there recommendations  • Recommend patient following with a movement and memory specialist as an out patient  • As reviewed with Dr Sheila Samayoa attending, referral placed to neurosurgery [facility] clinic  - Medical management and supportive care per primary team  Correction of any metabolic or infectious disturbances  Libra Zhu will need follow up in in 6 weeks with movement disorder and memory attending/AP  He will not require outpatient neurological testing  Subjective:   Neurological follow up  Patient reports he is doing well today  He denies any new complaints  In particular he denies headache, problems with vision, facial numbness, or problems with his speech  He denies any one-sided weakness or one-sided numbness or ataxia  He reports he has not had any further frozen episodes with his gait, though has not been walking as much in the hospital    He did not have a good night sleep last night  No events overnight  Vitals: Blood pressure 123/69, pulse 60, temperature 97 7 °F (36 5 °C), temperature source Temporal, resp  rate 18, height 5' 10\" (1 778 m), weight 76 6 kg (168 lb 14 oz), SpO2 96 %  ,Body mass index is 24 23 kg/m²       Current Facility-Administered Medications   Medication Dose Route Frequency Provider Last Rate   • acetaminophen  650 mg Oral Q4H PRN Antonio Murray, PA-C     • aluminum-magnesium hydroxide-simethicone  30 mL Oral Q6H PRN Antonio Murray, PA-C     • aspirin  81 mg Oral Daily Antonio Murray, PA-C     • colchicine  0 6 " mg Oral Daily Leo Macedo PA-C     • cyanocobalamin  1,000 mcg Oral Daily Neel Oliveira, DO     • cyanocobalamin  1,000 mcg Intramuscular Q30 Days Jeni Arriaga DO     • docusate sodium  100 mg Oral BID Leo Macedo PA-C     • enoxaparin  40 mg Subcutaneous Daily Leo Macedo PA-C     • famotidine  20 mg Oral Daily Leo Macedo PA-C     • furosemide  40 mg Oral Daily Leo Macedo PA-C     • loratadine  10 mg Oral Daily Leo Macedo PA-C     • losartan  100 mg Oral Daily Leo Macedo PA-C     • ondansetron  4 mg Intravenous Q6H PRN Leo Macedo PA-C     • pravastatin  80 mg Oral Daily With Mitch YOLIE Flanagan     • tamsulosin  0 4 mg Oral Daily With Dinner Leo Macedo PA-C         Physical Exam:   Neurological  Mental status -the patient is awake and alert oriented x3, he is able to tell me the month, exact day of week, president [place]  He is able to do simple calculations and spell world forwards and backwards  He was able to follow simple and complex cross body commands  He was able to read and repeat  He had good knowledge into current medical condition  There was some hesitancy in his speech, however no gross aphasia or dysarthria  He was able to tell me why he was in the hospital and provide entire history  3/3 immediate recall, 0/3 remoted  Cranial nerves 2 through 12 -pupils were equal and reactive, extract movements are intact without any disconjugate gaze or gaze palsy, visual fields full, V1 through V3 intact to touch and temperature, no facial droop noted, tongue was midline, hearing intact to conversation, sternocleidomastoids intact  Motor - Again, 5/5 upper extremities and lower extremities, without drift, and bulk, except he did have bilateral hip flexion extension mild weakness 5- out of 5  There was slight increase in tone in the uppers and lowers, there was evidence of slight cog wheeling this a m , bilateral uppers     No tremor or fixed deficit noted  Rapid movements are equal bilaterally  Sensation - nonfocal to touch or temperature, he did have a slight decrease in vibratory sense in the bilateral lowers at the toes - compared to ankles  No neglect  Coordination - no ataxia noted on finger-to-nose - there was past pointing bilaterally - no dysmetria  Reflexes are equal - brisk in the uppers and lowers, symmetric 2-3 in the uppers, -23 at the knees, no clonus, negative castellano's  Toes are downgoing bilaterally  No evidence of seizure activity, observed  No meningismus  Lab, Imaging and other studies: I have personally reviewed pertinent reports  Procedure: CTA head and neck with and without contrast    Result Date: 3/27/2023  Narrative: CTA NECK AND BRAIN WITH AND WITHOUT CONTRAST INDICATION: Transient ischemic attack (TIA) CVA like symptoms COMPARISON:   Head CT from March 20, 2019  TECHNIQUE:  Routine CT imaging of the Brain without contrast   Post contrast imaging was performed after administration of iodinated contrast through the neck and brain  Post contrast axial 0 625 mm images timed to opacify the arterial system  3D rendering was performed on an independent workstation  MIP reconstructions performed  Coronal reconstructions were performed of the noncontrast portion of the brain  Radiation dose length product (DLP) for this visit:  1405 mGy-cm   This examination, like all CT scans performed in the [facility], was performed utilizing techniques to minimize radiation dose exposure, including the use of iterative reconstruction and automated exposure control  IV Contrast:  85 mL of iohexol (OMNIPAQUE)  IMAGE QUALITY:   Diagnostic FINDINGS: NONCONTRAST BRAIN PARENCHYMA:  No interval change in appearance of the brain parenchyma  Generalized cortical volume loss and mild periventricular chronic microangiopathic changes  VENTRICLES AND EXTRA-AXIAL SPACES:  Stable ventriculomegaly and dilatation of the sylvian fissures    Sylvian fissures are moderately dilated  Lateral and 3rd ventricle is more dilated and the 4th ventricle with a patent cerebral aqueduct  VISUALIZED ORBITS: Normal visualized orbits  PARANASAL SINUSES: Normal visualized paranasal sinuses  CERVICAL VASCULATURE AORTIC ARCH AND GREAT VESSELS:  Mild atherosclerotic disease of the arch, proximal great vessels and visualized subclavian vessels  No significant stenosis  RIGHT VERTEBRAL ARTERY CERVICAL SEGMENT:  Normal origin  The vessel is normal in caliber throughout the neck  LEFT VERTEBRAL ARTERY CERVICAL SEGMENT:  Normal origin  The vessel is normal in caliber throughout the neck  RIGHT EXTRACRANIAL CAROTID SEGMENT: Calcified and noncalcified atherosclerotic plaque with prominent ulceration (5:353) noted at the ICA origin  There is mild (less than 50% origin stenosis  No tandem stenosis in the remaining cervical ICA  LEFT EXTRACRANIAL CAROTID SEGMENT:  Mild atherosclerotic disease of the distal common carotid artery and proximal cervical internal carotid artery without significant stenosis compared to the more distal ICA  NASCET criteria was used to determine the degree of internal carotid artery diameter stenosis  INTRACRANIAL VASCULATURE INTERNAL CAROTID ARTERIES:  Normal enhancement of the intracranial portions of the internal carotid arteries  Normal ophthalmic artery origins  Normal ICA terminus  ANTERIOR CIRCULATION:  Symmetric A1 segments and anterior cerebral arteries with normal enhancement  Normal anterior communicating artery  MIDDLE CEREBRAL ARTERY CIRCULATION:  M1 segment and middle cerebral artery branches demonstrate normal enhancement bilaterally  DISTAL VERTEBRAL ARTERIES:  Normal distal vertebral arteries  Posterior inferior cerebellar artery origins are normal  Normal vertebral basilar junction  BASILAR ARTERY:  Basilar artery is normal in caliber  Normal superior cerebellar arteries   POSTERIOR CEREBRAL ARTERIES: Both posterior cerebral arteries arises from the basilar tip  Both arteries demonstrate normal enhancement  Normal posterior communicating arteries  VENOUS STRUCTURES:  Normal  NON VASCULAR ANATOMY BONY STRUCTURES:  No acute osseous abnormality  Multilevel cervical spondylosis with additional findings of diffuse idiopathic skeletal hyperostosis  Periapical lucency about the right upper molar dental implant  Additional osseous defect within the anterior maxilla, presumably representing a site of prior tooth extraction with residual communication between the floor of the nasal cavity and the roof of the oral cavity  Direct inspection is recommended  SOFT TISSUES OF THE NECK:  Unremarkable  THORACIC INLET:  Normal      Impression: No acute intracranial abnormality  No cervical or intracranial large vessel occlusion  Bilateral cervical carotid atherosclerotic disease, more notable on the right where there is a prominent ICA origin penetrating ulcer  No hemodynamically significant carotid stenosis  Stable generalized atrophy, somewhat temporal and central predominant, with mildly disproportionate ventriculomegaly to sulcal prominence  In the appropriate clinical setting, consider NPH  Workstation performed: [id number]     Procedure: XR chest 1 view portable    Result Date: 3/27/2023  Narrative: CHEST INDICATION:   altered mental status  COMPARISON:  CXR 10/16/2022  EXAM PERFORMED/VIEWS:  XR CHEST PORTABLE  FINDINGS: Cardiomediastinal silhouette normal  Lungs clear  No effusion or pneumothorax  Upper abdomen normal  Bones normal for age  Impression: No acute cardiopulmonary disease  Workstation performed: [id number]     Procedure: VAS carotid complete study    Result Date: 3/28/2023  Narrative:  [facility] REPORT CLINICAL: Indications:  Patient presents with transient frozen gait of unknown origin  Physician wants to rule out B/L ICA stenosis   Operative History: No prior heart or vascular surgery Risk Factors: HTN, GERD, and hyperlipidemia  Height:  70 inches  Weight:  173 lbs  Clinical: Brachial BP:     Right:  142/66 mmHg  Left:  IV site  FINDINGS:  Right                Impression  PSV  EDV (cm/s)  Direction of Flow  Ratio  Dist  ICA                         59          12                      0 48  Mid  ICA                         137          21                      1 11  Prox  ICA            1 - 49%     158          24                      1 28  Carotid Bifurcation              120          16                            Dist CCA                         115          11                            Mid CCA                          123          16                      0 91  Prox CCA                         136          10                            Ext Carotid                       82          11                      0 67  Prox Vert                         42           6  Antegrade                 Subclavian                       122           8                             Left                 Impression  PSV  EDV (cm/s)  Direction of Flow  Ratio  Dist  ICA                        105          24                      1 19  Mid  ICA                          79          16                      0 91  Prox  ICA            1 - 49%     100          18                      1 15  Carotid Bifurcation              106          14                            Dist CCA                          80          11                            Mid CCA                           88          11                      0 71  Prox CCA                         123          20                            Ext Carotid                      108           5                      1 23  Prox Vert                         54          13  Antegrade                 Subclavian                       125           7                               CONCLUSION:  Impression RIGHT: There is <50% stenosis noted in the internal carotid artery  Plaque is heterogenous and irregular   Vertebral artery flow is antegrade  There is no significant subclavian artery disease  LEFT: There is <50% stenosis noted in the internal carotid artery  Plaque is heterogenous and irregular  Vertebral artery flow is antegrade  There is no significant subclavian artery disease  Technical findings posted on [employer] and flagged to Dr Denice Wilkins  SIGNATURE: Electronically Signed by: Andrew Newton on 2023-03-28 02:53:12 PM    Procedure: Echo complete w/ contrast if indicated    Result Date: 3/28/2023  Narrative: •  Left Ventricle: Left ventricular cavity size is normal  Wall thickness is normal  The left ventricular ejection fraction is 60%  Systolic function is normal  Wall motion is normal  Diastolic function is mildly abnormal, consistent with grade I (abnormal) relaxation  •  Right Ventricle: Right ventricular cavity size is moderately dilated  •  Right Atrium: The atrium is mildly dilated  •  Aortic Valve: The aortic valve is trileaflet  The leaflets are mildly calcified  There is mildly reduced mobility  There is mild regurgitation  The aortic valve has no significant stenosis  The aortic valve peak velocity is 1 74 m/s  The aortic valve mean gradient is 7 mmHg  The aortic valve area is 2 31 cm2  •  Mitral Valve: There is mild regurgitation  •  Tricuspid Valve: There is mild to moderate regurgitation  The right ventricular systolic pressure is mildly elevated  The estimated right ventricular systolic pressure is 85 00 mmHg  Recent Results (from the past 24 hour(s))   TSH, 3rd generation with Free T4 reflex    Collection Time: 03/28/23  2:44 PM   Result Value Ref Range    TSH 3RD GENERATON 1 448 0 450 - 4 500 uIU/mL   Folate    Collection Time: 03/28/23  2:44 PM   Result Value Ref Range    Folate 18 2 (H) 3 1 - 17 5 ng/mL     Counseling / Coordination of Care  Reviewed case with neurology attending, history and physical examination, labs and imaging completed, plan of care as per attending physician    Please see attestation for further details

## 2023-03-29 NOTE — TREATMENT PLAN
----- Message from Corey Carrington MD sent at 7/7/2020 10:52 AM CDT -----  Regarding: RE: abn diabetic labs  I think we need to postpone to get that under control.  Please send a message to the primary and we will cancel for now until labs are better.  This puts at increased risk for infection.    [facility]  ----- Message -----  From: Theresa Cole RN  Sent: 7/7/2020   8:05 AM CDT  To: Corey Carrington MD  Subject: abn diabetic labs                                Pt is scheduled thursday for total shoulder.  Her PMD had her labs drawn at an [place], she is from a wellness clinic outside of [place]. I dont think PMD has seen them yet.  Pt's a1c is 11.4, glucose 340, prob not fasting since i know these labs were drawn in afternoon.  Please advise if this changes anything. It looks like her a1c has been on the rise  Thanks,   Elham       Vascular Surgery    87M nonsmoker w/HTN, HLD, BPH, ambulatory dysfunction, presents with bilateral lower extremity weakness and trouble walking  CTA of the head and neck was ordered which demonstrates 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       MRI obtained which shows no evidence of acute/recent CVA    Presume carotid disease is asymptomatic  Recommend outpatient follow up with our office for surveillance  Continue medical management with antiplatelet and statin  No surgical intervention indicated    Cheri Reyes PA-C  [facility]

## 2023-03-29 NOTE — TELEPHONE ENCOUNTER
Established PB/AB pt at [facility]- Seen in consult today at [facility] admitted for walking/balance issues stroke workup negative called back for mild hematuria, urine culture pending  Voiding on his own  Will go home on PO abx empirically, please followup with Rakesh Gary in 1-2 weeks  Follow culture data   May need cysto down the road

## 2023-03-29 NOTE — PHYSICAL THERAPY NOTE
PHYSICAL THERAPY NOTE          Patient Name: Taylor Bowling  Today's Date: 3/29/2023     03/29/23 1678   PT Last Visit   PT Visit Date 03/29/23   Note Type   Note type Cancelled Session   Cancel Reasons Patient off floor/test   Additional Comments Currently off floor for testing  Will follow and progress as appropriate       Radha Zelaya, PT

## 2023-03-29 NOTE — ASSESSMENT & PLAN NOTE
Hx of BPH, no flank tenderness  UA from 3/27 with no acute infection  Urology consult, recheck UA, likely will need OP cysto

## 2023-03-29 NOTE — ASSESSMENT & PLAN NOTE
• Arrives for evaluation of difficulty walking, leg weakness  • Neurology evaluation placed  • B12 to be repleted  • MRI Negative for stroke  • Evidence of carotid artery stenosis, will need OP follow up     • EEG , with no active seizure activity  • Enlarged ventricles, will need outpatient NPH evaluation

## 2023-03-29 NOTE — CONSULTS
"Consult - Urology   Cezar Welsh 1935, 81 y o  male MRN: [id number]    Unit/Bed#: E4 -02 Encounter: [id number]    Assessment & Plan  Gross hematuria  Dysuria    Send urinalysis/culture  Empiric oral antibiotics  Follow retention protocol  Avoid catheterization unless failing retention protocol  Followup in urology office in 1-2 weeks for symptom reassessment  Outpatient cystoscopy if no UTI cause found  Prior negative prostate biopsy and stable low PSAs in recent years  No renal stone/mass history  Urology will sign off but remain available for any further inpatient needs  Please feel free to contact the provider currently covering the Urology [facility] role for this campus with questions or concerns  Subjective: admitted with difficulty walking balance and concern for neurologic event  Stroke workup this admission without major findings  Currently feels he is back to baseline  Had a bloody urination today thus urology was consulted  He is on lovenox this hospital stay no other blood thinners  No reported catheterizations  In retrospect he now admits to some minor dysuria for the past 2-3 days  Last urination in the urinal is clear bennett with pink tinge  Urologic history: BPH, on flomax for years  No prior  surgery or major bleeding episodes in past  Remote prostate biopsy 10 years ago was negative for cancer  Review of Systems   Constitutional: Negative  Respiratory: Negative  Cardiovascular: Negative  Genitourinary: Positive for dysuria and hematuria  Negative for decreased urine volume, difficulty urinating, flank pain, frequency and urgency  Musculoskeletal: Negative  Objective:  Vitals: Blood pressure 123/69, pulse 60, temperature 97 7 °F (36 5 °C), temperature source Temporal, resp  rate 18, height 5' 10\" (1 778 m), weight 76 6 kg (168 lb 14 oz), SpO2 96 %  ,Body mass index is 24 23 kg/m²  Physical Exam  Vitals and nursing note reviewed     Constitutional: " General: He is not in acute distress  Appearance: He is well-developed  He is not diaphoretic  HENT:      Head: Normocephalic and atraumatic  Pulmonary:      Effort: Pulmonary effort is normal    Abdominal:      General: There is no distension  Tenderness: There is no abdominal tenderness  There is no right CVA tenderness or left CVA tenderness  Hernia: No hernia is present  Genitourinary:     Comments: Circumcised penis, normal phallus, orthotopic patent meatus  Testes smooth descended bilaterally into the scrotum nontender with no palpable mass  Left hydrocele unchanged from baseline  Skin:     General: Skin is warm and dry  Neurological:      Mental Status: He is alert and oriented to person, place, and time  Psychiatric:         Speech: Speech normal          Behavior: Behavior normal          Imaging:  None pertinent  Last abd/pelvis CT 2018  unremarkable  Labs:  Recent Labs     03/27/23  1559 03/28/23  0514   WBC 7 25 7 89     Recent Labs     03/27/23  1559 03/28/23  0514   HGB 12 1 12 1       Recent Labs     03/27/23  1559 03/28/23  0514   CREATININE 1 36* 1 15       Microbiology:  Ua/cx sent today    History:  Social History     Socioeconomic History   • Marital status: Single     Spouse name: None   • Number of children: None   • Years of education: None   • Highest education level: None   Occupational History   • None   Tobacco Use   • Smoking status: Never   • Smokeless tobacco: Never   Substance and Sexual Activity   • Alcohol use:  Yes     Alcohol/week: 2 0 standard drinks     Types: 2 Glasses of wine per week   • Drug use: No   • Sexual activity: None   Other Topics Concern   • None   Social History Narrative   • None     Social Determinants of Health     Financial Resource Strain: Not on file   Food Insecurity: Not on file   Transportation Needs: Not on file   Physical Activity: Not on file   Stress: Not on file   Social Connections: Not on file   Intimate Partner Violence: Not on file   Housing Stability: Not on file     Financial Resource Strain: Not on file   Food Insecurity: Not on file   Transportation Needs: Not on file   Physical Activity: Not on file   Stress: Not on file   Social Connections: Not on file   Intimate Partner Violence: Not on file   Housing Stability: Not on file      Diagnosis Date   • Allergic rhinitis    • BPH with obstruction/lower urinary tract symptoms 2016   • Eye disorder    • Frequency of urination 2015   • Gout    • H/O gastroesophageal reflux (GERD)    • Hypercholesteremia    • Hypertension    • Incomplete bladder emptying 2016   • UTI (urinary tract infection) 2015     Past Surgical History:   Procedure Laterality Date   • CATARACT EXTRACTION, BILATERAL     • COLONOSCOPY  2002   • TONSILLECTOMY  1942     Family History   Problem Relation Age of Onset   • Heart failure Mother    • Heart attack Father    • Hypertension Father        Chino Ramos  Date: 3/29/2023 Time: 3:35 PM

## 2023-03-29 NOTE — NURSING NOTE
Assumed care of patient at this time  Sleeping in no apparent distress  Call light in reach  Will continue to monitor

## 2023-03-29 NOTE — PROGRESS NOTES
[address] [place] [address]  Progress Note  Name: Umm Urban  MRN: [id number]  Unit/Bed#: E4 -73 I Date of Admission: 3/27/2023   Date of Service: 3/29/2023 I Hospital Day: 2    Assessment/Plan   Hematuria  Assessment & Plan  Hx of BPH, no flank tenderness  UA from 3/27 with no acute infection  Urology consult, recheck UA, likely will need OP cysto    B12 deficiency  Assessment & Plan  Start B12 orally    Ambulatory dysfunction  Assessment & Plan  Reports baseline use of cane, he is okay walking on flat surfaces, difficulty with surface changes  PT/OT evaluation  MRI negative for stroke, 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  NPH negative  EEG performed result are pending    * Stroke-like symptom  Assessment & Plan  • Arrives for evaluation of difficulty walking, leg weakness  • Neurology evaluation placed  • B12 to be repleted  • MRI Negative for stroke  • Evidence of carotid artery stenosis, will need OP follow up  • EEG , with no active seizure activity  • Enlarged ventricles, will need outpatient NPH evaluation         [facility]'s Internal Medicine Progress Note  Patient: Umm Urban 81 y o  male   MRN: [id number]  PCP: Kirsten Arreola DO  Unit/Bed#: E4 -02 Encounter: [id number]  Date Of Visit: 03/29/23    Assessment:    Principal Problem:    Stroke-like symptom  Active Problems:    Ambulatory dysfunction    B12 deficiency    Hematuria      Plan:    · Continue current medical management  · Discharge planning  · Check UA  · Neurology consultation  · Hold Lovenox due to concern for hematuria       VTE Pharmacologic Prophylaxis:   Pharmacologic: Enoxaparin (Lovenox)  Mechanical VTE Prophylaxis in Place: Yes    Patient Centered Rounds: I have performed bedside rounds with nursing staff today      Discussions with Specialists or Other Care Team Provider: Case management, urology    Education and Discussions with Family / Patient: Patient niece who is a physician in [facility]    Time Spent for Care: 1 hour  More than 50% of total time spent on counseling and coordination of care as described above  Current Length of Stay: 2 day(s)    Current Patient Status: Inpatient   Certification Statement: The patient will continue to require additional inpatient hospital stay due to Hematuria    Discharge Plan / Estimated Discharge Date: Tomorrow    Code Status: Level 3 - DNAR and DNI      Subjective:   Seen and examined, no acute complaints  Did report single episode of hematuria to the nursing staff  Denies any flank pain, no fevers or chills  A complete and comprehensive 14 point organ system review has been performed and all other systems are negative other than stated above  Objective:     Vitals:   Temp (24hrs), Av 3 °F (36 8 °C), Min:97 7 °F (36 5 °C), Max:99 3 °F (37 4 °C)    Temp:  [97 7 °F (36 5 °C)-99 3 °F (37 4 °C)] 99 3 °F (37 4 °C)  HR:  [57-66] 57  Resp:  [18] 18  BP: (119-146)/(64-74) 125/70  SpO2:  [96 %-98 %] 97 %  Body mass index is 24 23 kg/m²  Input and Output Summary (last 24 hours):        Intake/Output Summary (Last 24 hours) at 3/29/2023 1630  Last data filed at 3/29/2023 5662  Gross per 24 hour   Intake 60 ml   Output 325 ml   Net -265 ml       Physical Exam:     General: well appearing, no acute distress  HEENT: atraumatic, PERRLA, moist mucosa, normal pharynx, normal tonsils and adenoids, normal tongue, no fluid in sinuses  Neck: Trachea midline, no carotid bruit, no masses  Respiratory: normal chest wall expansion, CTA B, no r/r/w, no rubs  Cardiovascular: RRR, no m/r/g, Normal S1 and S2  Abdomen: Soft, non-tender, non-distended, normal bowel sounds in all quadrants, no hepatosplenomegaly, no tympany  Rectal: deferred  Musculoskeletal: normal ROM in upper and lower extremities  Integumentary: warm, dry, and pink, with no rash, purpura, or petechia  Heme/Lymph: no lymphadenopathy, no bruises  Neurological: Cranial Nerves II-XII grossly intact, no tics, normal sensation to pressure and light touch  Psychiatric: cooperative with normal mood, affect, and cognition      Additional Data:     Labs:    Results from last 7 days   Lab Units 03/28/23  0514 03/27/23  1559   WBC Thousand/uL 7 89 7 25   HEMOGLOBIN g/dL 12 1 12 1   HEMATOCRIT % 37 2 37 0   PLATELETS Thousands/uL 223 217   NEUTROS PCT %  --  81*   LYMPHS PCT %  --  14   MONOS PCT %  --  5   EOS PCT %  --  0     Results from last 7 days   Lab Units 03/28/23  0514 03/27/23  1559   POTASSIUM mmol/L 3 7 4 2   CHLORIDE mmol/L 106 103   CO2 mmol/L 29 31   BUN mg/dL 17 20   CREATININE mg/dL 1 15 1 36*   CALCIUM mg/dL 9 3 9 8   ALK PHOS U/L  --  65   ALT U/L  --  9   AST U/L  --  11*           * I Have Reviewed All Lab Data Listed Above  * Additional Pertinent Lab Tests Reviewed:  Chung 67 Admission Reviewed    Imaging:    Imaging Reports Reviewed Today Include: MRI of the brain  Imaging Personally Reviewed by Myself Includes: MRI of the brain    Recent Cultures (last 7 days):           Last 24 Hours Medication List:   Current Facility-Administered Medications   Medication Dose Route Frequency Provider Last Rate   • acetaminophen  650 mg Oral Q4H PRN Tamica Hernandes PA-C     • aluminum-magnesium hydroxide-simethicone  30 mL Oral Q6H PRN Tamica Hernandes PA-C     • aspirin  81 mg Oral Daily Tamica Hernandes PA-C     • colchicine  0 6 mg Oral Daily Tamica Hernandes PA-C     • cyanocobalamin  1,000 mcg Oral Daily Neel Nguyen DO     • cyanocobalamin  1,000 mcg Intramuscular Q30 Days Jo Ann Gamez DO     • docusate sodium  100 mg Oral BID Tamica Hernandes PA-C     • enoxaparin  40 mg Subcutaneous Daily Tamica Hernandes PA-C     • famotidine  20 mg Oral Daily Taimca Hernandes PA-C     • furosemide  40 mg Oral Daily Tamica Hernandes PA-C     • loratadine  10 mg Oral Daily Tamica Hernandes PA-C     • losartan  100 mg Oral Daily Tamica Hernandes PA-C     • ondansetron  4 mg Intravenous Q6H PRN Tamica Hernandes PA-C • pravastatin  80 mg Oral Daily With Yoana Langford PA-C     • tamsulosin  0 4 mg Oral Daily With Roel Galindo PA-C          Today, Patient Was Seen By: Tomasa Myles DO    ** Please Note: This note was completed in part utilizing [employer] Direct Software  Grammatical errors, random word insertions, spelling mistakes, and incomplete sentences may be an occasional consequence of this system secondary to software limitations, ambient noise, and hardware issues  If you have any questions or concerns about the content, text, or information contained within the body of this dictation, please contact the provider for clarification   **

## 2023-03-29 NOTE — ASSESSMENT & PLAN NOTE
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

## 2023-03-30 VITALS
DIASTOLIC BLOOD PRESSURE: 67 MMHG | OXYGEN SATURATION: 97 % | HEIGHT: 70 IN | WEIGHT: 167.77 LBS | BODY MASS INDEX: 24.02 KG/M2 | TEMPERATURE: 98.1 F | HEART RATE: 64 BPM | SYSTOLIC BLOOD PRESSURE: 142 MMHG | RESPIRATION RATE: 18 BRPM

## 2023-03-30 PROBLEM — I10 HYPERTENSION: Status: ACTIVE | Noted: 2023-03-30

## 2023-03-30 RX ORDER — CIPROFLOXACIN 500 MG/1
500 TABLET, FILM COATED ORAL EVERY 12 HOURS SCHEDULED
Qty: 10 TABLET | Refills: 0 | Status: SHIPPED | OUTPATIENT
Start: 2023-03-30 | End: 2023-04-04

## 2023-03-30 RX ORDER — ASPIRIN 81 MG/1
81 TABLET ORAL DAILY
Qty: 30 TABLET | Refills: 0 | Status: SHIPPED | OUTPATIENT
Start: 2023-03-30 | End: 2023-04-29

## 2023-03-30 RX ORDER — CIPROFLOXACIN 500 MG/1
500 TABLET, FILM COATED ORAL EVERY 12 HOURS SCHEDULED
Status: DISCONTINUED | OUTPATIENT
Start: 2023-03-30 | End: 2023-03-30 | Stop reason: HOSPADM

## 2023-03-30 RX ADMIN — CYANOCOBALAMIN TAB 500 MCG 1000 MCG: 500 TAB at 08:13

## 2023-03-30 RX ADMIN — FUROSEMIDE 40 MG: 40 TABLET ORAL at 08:13

## 2023-03-30 RX ADMIN — FAMOTIDINE 20 MG: 20 TABLET ORAL at 08:13

## 2023-03-30 RX ADMIN — LOSARTAN POTASSIUM 100 MG: 50 TABLET, FILM COATED ORAL at 08:13

## 2023-03-30 RX ADMIN — LORATADINE 10 MG: 10 TABLET ORAL at 08:13

## 2023-03-30 RX ADMIN — ASPIRIN 81 MG CHEWABLE TABLET 81 MG: 81 TABLET CHEWABLE at 08:13

## 2023-03-30 RX ADMIN — DOCUSATE SODIUM 100 MG: 100 CAPSULE, LIQUID FILLED ORAL at 08:13

## 2023-03-30 RX ADMIN — CIPROFLOXACIN 500 MG: 500 TABLET, FILM COATED ORAL at 13:21

## 2023-03-30 NOTE — PHYSICAL THERAPY NOTE
PHYSICAL THERAPY NOTE          Patient Name: Clare Naylor  HGWDL'V Date: 3/30/2023  09:56-10:40       03/30/23 1040   PT Last Visit   PT Visit Date 03/30/23   Note Type   Note Type Treatment   Pain Assessment   Pain Score No Pain   Restrictions/Precautions   Weight Bearing Precautions Per Order No   Other Precautions Fall Risk; Chair Alarm; Bed Alarm;Telemetry   General   Chart Reviewed Yes   Response to Previous Treatment Patient with no complaints from previous session  Family/Caregiver Present No   Cognition   Orientation Level Oriented X4   Comments pleasant  Tangential    Subjective   Subjective No c/o this am   Hopeful to go home   Bed Mobility   Additional Comments received sitting OOB in chair  Transfers   Sit to Stand 5  Supervision   Additional items Increased time required;Verbal cues   Stand to Sit 5  Supervision   Additional items Increased time required;Verbal cues;Armrests   Additional Comments cues for hand placement  Ambulation/Elevation   Gait pattern Decreased foot clearance; Forward Flexion; Improper Weight shift; Inconsistent hermes; Short stride   Gait Assistance 5  Supervision   Additional items Verbal cues   Assistive Device Rolling walker   Distance Amb with  x 2  Standing rest x 4  Following seated rest another 200 ft with RW, standing rests x 3  Balance   Static Sitting Good   Dynamic Sitting Fair   Static Standing Fair   Dynamic Standing Fair -   Ambulatory Fair -   Endurance Deficit   Endurance Deficit Yes   Endurance Deficit Description fatigue, pacing required  Activity Tolerance   Activity Tolerance Treatment limited secondary to medical complications (Comment); Patient limited by fatigue   Medical Staff Made Aware Nurse, [address]   Nurse Made Aware yes   Exercises   Hip Flexion Sitting;10 reps;AROM; Bilateral   Hip Abduction Sitting;10 reps;AROM; Bilateral   Hip Adduction Sitting;10 reps;AROM; Bilateral   Knee AROM Long Arc Quad Sitting;10 reps;AROM; Bilateral   Ankle Pumps Sitting;10 reps;AROM; Bilateral   Heel Cord Stretch Sitting;10 reps;AROM; Bilateral  (heel raises )   Neuro re-ed performed repetitive sit to stand x 5  Assessment   Prognosis Good   Problem List Decreased strength;Decreased endurance; Impaired balance;Decreased mobility; Impaired judgement;Decreased safety awareness   Assessment Seen for progression of PT  Tolerated session with pacing  Cues for hand placement with transitions  Able to ambulate increased distances with RW support and S  No LOB with use of RW  Standing rests needed  Cues when performing ex needed for quality of exercise  Cont  rec use of RW and HHPT at d/c  The patient's AM-PAC Basic Mobility Inpatient Short Form Raw Score is 23  A Raw score of greater than 16 suggests the patient may benefit from discharge to home  Please also refer to the recommendation of the Physical Therapist for safe discharge planning  HHPT and RW for home  Will d/c PT to restorative program    Goals   Patient Goals go home   STG Expiration Date 04/07/23   Plan   Treatment/Interventions Functional transfer training;LE strengthening/ROM; Therapeutic exercise; Endurance training;Patient/family training;Equipment eval/education; Bed mobility;Gait training; Compensatory technique education;Continued evaluation;Spoke to nursing;OT   Progress Improving as expected   PT Frequency Other (Comment)  (d/c PT to restorative care)   Recommendation   PT Discharge Recommendation Home with home health rehabilitation   Equipment Recommended [place] walker   AM-[facility] Basic Mobility Inpatient   Turning in Flat Bed Without Bedrails 4   Lying on Back to Sitting on Edge of Flat Bed Without Bedrails 4   Moving Bed to Chair 4   Standing Up From Chair Using Arms 4   Walk in Room 4   Climb 3-5 Stairs With Railing 3   Basic Mobility Inpatient Raw Score 23   Basic Mobility Standardized Score 50 88   Highest Level Of Mobility   JH-HLM Goal 7: Walk 25 feet or more   JH-HLM Achieved 8: Walk 250 feet ot more   Education   Education Provided Mobility training;Home exercise program;Assistive device   Patient Reinforcement needed   End of Consult   Patient Position at End of Consult Bedside chair;Bed/Chair alarm activated; All needs within reach   Anya Levi, PT

## 2023-03-30 NOTE — ASSESSMENT & PLAN NOTE
Hx of BPH, no flank tenderness  UA with concern for infection - start Ciprofloxacin 500 mg twice a day  OP Urology follow up

## 2023-03-30 NOTE — DISCHARGE INSTR - AVS FIRST PAGE
Dear Gary Panchal,     It was our pleasure to care for you here at [facility]  It is our hope that we were always able to exceed the expected standards for your care during your stay  You were hospitalized due to ambulatory dysfunction, strokelike symptoms  You were cared for on the fourth floor by Joel Galeas DO with the [facility] who covers for your primary care physician (PCP), Juan Bernardo DO, while you were hospitalized  If you have any questions or concerns related to this hospitalization, you may contact us at [phone]  For follow up as well as any medication refills, we recommend that you follow up with your primary care physician  A registered nurse will reach out to you by phone within a few days after your discharge to answer any additional questions that you may have after going home  However, at this time we provide for you here, the most important instructions / recommendations at discharge:     Notable Medication Adjustments -   No changes to chronic home medications  Aspirin 81 mg enteric-coated  B12  Ciprofloxacin has been ordered for potential urinary tract infection given a penicillin allergy  Testing Required after Discharge -   Follow-up with PCP B12 level    Important follow up information -   Outpatient follow-up in the NPH clinic with neurosurgery  Other Instructions -   Urology follow-up to be scheduled given her hematuria  Please review this entire after visit summary as additional general instructions including medication list, appointments, activity, diet, any pertinent wound care, and other additional recommendations from your care team that may be provided for you        Sincerely,     Joel Galeas DO and Nurse Bárbara Lama

## 2023-03-30 NOTE — ASSESSMENT & PLAN NOTE
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

## 2023-03-30 NOTE — DISCHARGE SUMMARY
[address] [facility]  Discharge- Juju Dias 1935, 81 y o  male MRN: [id number]  Unit/Bed#: E4 -02 Encounter: [id number]  Primary Care Provider: Oscar Draper DO   Date and time admitted to hospital: 3/27/2023  2:32 PM    Admitting Provider:  Yaidra Han MD  Discharge Provider:  Ted García DO  Admission Date: 3/27/2023       Discharge Date: 03/30/23   LOS: 3  Primary Care Physician at Discharge: Oscar Draper, [address] COURSE:  Juju Dias is a 81 y o  male who presented with ambulatory dysfunction tested by freezing gait  Due to his symptoms the patient was placed on a stroke pathway  MRI of the brain was negative for any acute stroke  He had been noted to be hypertensive prior to admission  But this improved with no specific treatment  He did have a gait dysfunction with subtle Parkinson-like features  The patient was evaluated in consultation by the neurology service and they recommended outpatient work-up including NPH evaluation given enlarged ventricles on CT imaging  Patient was also noted to have carotid artery stenosis which was felt to be mild, there was evidence of ulcerated plaque  The patient was started on antiplatelet as well as statin therapy and given negative MRI imaging this was not felt to be suggestive of a lateralizing neurologic event  The patient will need outpatient vascular follow-up  At the time of discharge the patient was on oral diet without acute complaint was medically optimized for discharge  All questions answered the patient's satisfaction he is agreement with the discharge plan      DISCHARGE DIAGNOSES  * Stroke-like symptom  Assessment & Plan  • Arrives for evaluation of difficulty walking, leg weakness  • Neurology evaluation placed  • B12 to be repleted  • MRI Negative for stroke  • Evidence of carotid artery stenosis, will need OP follow up     • EEG , with no active seizure activity  • Enlarged ventricles, will need outpatient NPH evaluation    Hypertension  Assessment & Plan  Patient with accelerated hypertension present on admission  This likely contributed to patient's strokelike symptoms  Close monitoring as an outpatient and with VNA    Hematuria  Assessment & Plan  Hx of BPH, no flank tenderness  UA with concern for infection - start Ciprofloxacin 500 mg twice a day  OP Urology follow up     B12 deficiency  Assessment & Plan  Started B12 orally    Ambulatory dysfunction  Assessment & Plan  Reports baseline use of cane, he is okay walking on flat surfaces, difficulty with surface changes  PT/OT evaluation  MRI negative for stroke, 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  NPH negative  EEG performed and no seizure activity    CONSULTING PROVIDERS   IP CONSULT TO NEUROLOGY  IP CONSULT TO CASE MANAGEMENT  IP CONSULT TO VASCULAR SURGERY  IP CONSULT TO UROLOGY    PROCEDURES PERFORMED  * No surgery found *    RADIOLOGY RESULTS  EEG awake or drowsy routine    Result Date: 3/29/2023  Narrative: Table formatting from the original result was not included  ELECTROENCEPHALOGRAM (EEG) Patient Name:  Zach Wilhelm  MRN: [id number] :  1935 File #: [id number] Age: 81 y o  Date performed: 3/29/2023        Report date: 3/29/2023      Study type: Routine EEG ICD 10 diagnosis: Spells/Fit NOS R56 9 Start time: 12:33 End time: 13:02 --------------------------------------------------------------------------- ---------------------------------------- Patient History: This recording was observed in a 81 y o  male to determine whether a spell of freezing was due to seizure   Medications include: no AEDs --------------------------------------------------------------------------- ---------------------------------------- Description of Procedure: · 32 channel digital recording with electrodes placed according to the International 10-20 system with additional T1/T2 electrodes, EOG, EKG, and simultaneous video  The recording was technically satisfactory  --------------------------------------------------------------------------- ---------------------------------------- EEG Description: The recording was performed with the patient awake, drowsy, and briefly asleep  He was fully oriented  During wakefulness, there were brief runs of well regulated, low amplitude, posteriorly dominant, symmetric 9 cps alpha rhythm that attenuated with eye opening  There were symmetric low amplitude, frontally dominant beta activities  There were intermittent, bilateral, posteriorly dominant, medium amplitude, 2 5 cps delta activities  With drowsiness, alpha activity attenuated and was replaced by diffusely distributed theta activities  With very brief sleep, symmetric sleep spindles were present  --------------------------------------------------------------------------- ---------------------------------------- Activation Procedures: Hyperventilation was not performed  Stepped photic stimulation between 1-30 cps was performed and induced symmetric photic driving  Other findings: The single lead ECG demonstrated normal sinus rhythm --------------------------------------------------------------------------- ---------------------------------------- EEG Interpretation: This Routine EEG recorded during wakefulness, drowsiness, and brief sleep is abnormal  Intermittent bilateral posterior delta activities suggests underlying neuronal dysfunction in the bilateral posterior region  Johan Huang MD [facility]     CTA head and neck with and without contrast      Impression: No acute cardiopulmonary disease  MRI brain wo contrast    Impression: No mass effect, acute intracranial hemorrhage or evidence of recent infarction  Age-related involutional and mild chronic microvascular ischemic change  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   Stable ventriculomegaly, slightly out of proportion to the degree of cerebral volume loss and should be correlated clinically for the presence of normal pressure hydrocephalus  VAS carotid complete study    Result Date: 3/28/2023  Narrative:  [facility] REPORT CLINICAL: Indications:  Patient presents with transient frozen gait of unknown origin  Physician wants to rule out B/L ICA stenosis  Operative History: No prior heart or vascular surgery Risk Factors: HTN, GERD, and hyperlipidemia  Height:  70 inches  Weight:  173 lbs  Clinical: Brachial BP:     Right:  142/66 mmHg  Left:  IV site  FINDINGS:  Right                Impression  PSV  EDV (cm/s)  Direction of Flow  Ratio  Dist  ICA                         59          12                      0 48  Mid  ICA                         137          21                      1 11  Prox  ICA            1 - 49%     158          24                      1 28  Carotid Bifurcation              120          16                            Dist CCA                         115          11                            Mid CCA                          123          16                      0 91  Prox CCA                         136          10                            Ext Carotid                       82          11                      0 67  Prox Vert                         42           6  Antegrade                 Subclavian                       122           8                             Left                 Impression  PSV  EDV (cm/s)  Direction of Flow  Ratio  Dist  ICA                        105          24                      1 19  Mid  ICA                          79          16                      0 91  Prox   ICA            1 - 49%     100          18                      1 15  Carotid Bifurcation              106          14                            Dist CCA                          80          11                            Mid CCA                           88 11                      0 71  Prox CCA                         123          20                            Ext Carotid                      108           5                      1 23  Prox Vert                         54          13  Antegrade                 Subclavian                       125           7                               CONCLUSION:  Impression RIGHT: There is <50% stenosis noted in the internal carotid artery  Plaque is heterogenous and irregular  Vertebral artery flow is antegrade  There is no significant subclavian artery disease  LEFT: There is <50% stenosis noted in the internal carotid artery  Plaque is heterogenous and irregular  Vertebral artery flow is antegrade  There is no significant subclavian artery disease  Technical findings posted on [facility] and flagged to Dr Buddy Pattersonr  SIGNATURE: Electronically Signed by: Bereket Paz on 2023-03-28 02:53:12 PM    Echo complete w/ contrast if indicated    Result Date: 3/28/2023  Narrative: •  Left Ventricle: Left ventricular cavity size is normal  Wall thickness is normal  The left ventricular ejection fraction is 60%  Systolic function is normal  Wall motion is normal  Diastolic function is mildly abnormal, consistent with grade I (abnormal) relaxation  •  Right Ventricle: Right ventricular cavity size is moderately dilated  •  Right Atrium: The atrium is mildly dilated  •  Aortic Valve: The aortic valve is trileaflet  The leaflets are mildly calcified  There is mildly reduced mobility  There is mild regurgitation  The aortic valve has no significant stenosis  The aortic valve peak velocity is 1 74 m/s  The aortic valve mean gradient is 7 mmHg  The aortic valve area is 2 31 cm2  •  Mitral Valve: There is mild regurgitation  •  Tricuspid Valve: There is mild to moderate regurgitation  The right ventricular systolic pressure is mildly elevated  The estimated right ventricular systolic pressure is 48 39 mmHg         LABS  Results from last 7 "days   Lab Units 03/28/23  0514 03/27/23  1559   WBC Thousand/uL 7 89 7 25   HEMOGLOBIN g/dL 12 1 12 1   HEMATOCRIT % 37 2 37 0   MCV fL 99* 99*   PLATELETS Thousands/uL 223 217     Results from last 7 days   Lab Units 03/28/23  0514 03/27/23  1559   SODIUM mmol/L 142 141   POTASSIUM mmol/L 3 7 4 2   CHLORIDE mmol/L 106 103   CO2 mmol/L 29 31   BUN mg/dL 17 20   CREATININE mg/dL 1 15 1 36*   CALCIUM mg/dL 9 3 9 8   ALBUMIN g/dL  --  4 2   TOTAL BILIRUBIN mg/dL  --  0 55   ALK PHOS U/L  --  65   ALT U/L  --  9   AST U/L  --  11*   EGFR ml/min/1 73sq m 57 46   GLUCOSE RANDOM mg/dL 94 116     Results from last 7 days   Lab Units 03/27/23  1810 03/27/23  1559   HS TNI 0HR ng/L  --  13   HS TNI 2HR ng/L 16  --                   Results from last 7 days   Lab Units 03/27/23  1559   HEMOGLOBIN A1C % 5 4     Results from last 7 days   Lab Units 03/28/23  1444   TSH 3RD GENERATON uIU/mL 1 448         Results from last 7 days   Lab Units 03/28/23  0514   TRIGLYCERIDES mg/dL 79   CHOLESTEROL mg/dL 161   LDL CALC mg/dL 90   HDL mg/dL 55       Cultures:   Results from last 7 days   Lab Units 03/29/23  1558   COLOR UA  Aline   CLARITY UA  Slightly Cloudy   SPEC GRAV UA  1 015   PH UA  7 0   LEUKOCYTES UA  Small*   NITRITE UA  Positive*   GLUCOSE UA mg/dl Negative   KETONES UA mg/dl Negative   BILIRUBIN UA  Negative   BLOOD UA  Large*      Results from last 7 days   Lab Units 03/29/23  1558   RBC UA /hpf Innumerable*   WBC UA /hpf 10-20*   EPITHELIAL CELLS WET PREP /hpf None Seen   BACTERIA UA /hpf Moderate*      Results from last 7 days   Lab Units 03/27/23  2104   INFLUENZA A PCR  Negative       PHYSICAL EXAM:  Vitals:   Blood Pressure: 142/67 (03/30/23 0730)  Pulse: 64 (03/30/23 0730)  Temperature: 98 1 °F (36 7 °C) (03/30/23 0730)  Temp Source: Temporal (03/30/23 0730)  Respirations: 18 (03/30/23 0730)  Height: 5' 10\" (177 8 cm) (03/28/23 0830)  Weight - Scale: 76 1 kg (167 lb 12 3 oz) (03/30/23 0600)  SpO2: 97 % (03/30/23 " 0730)      General: well appearing, no acute distress  HEENT: atraumatic, PERRLA, moist mucosa, normal pharynx, normal tonsils and adenoids, normal tongue, no fluid in sinuses  Neck: Trachea midline, no carotid bruit, no masses  Respiratory: normal chest wall expansion, CTA B, no r/r/w, no rubs  Cardiovascular: RRR, no m/r/g, Normal S1 and S2  Abdomen: Soft, non-tender, non-distended, normal bowel sounds in all quadrants, no hepatosplenomegaly, no tympany  Rectal: deferred  Musculoskeletal: normal ROM in upper and lower extremities  Integumentary: warm, dry, and pink, with no rash, purpura, or petechia  Heme/Lymph: no lymphadenopathy, no bruises  Neurological: Cranial Nerves II-XII grossly intact, no tics, normal sensation to pressure and light touch  Psychiatric: cooperative with normal mood, affect, and cognition      Discharge Disposition: Home/Self Care      Test Results Pending at Discharge:   Pending Labs     Order Current Status    Urine culture In process    Vitamin B1, whole blood In process              Medications   · Summary of Medication Adjustments made as a result of this hospitalization: See discharge list  · Medication Dosing Tapers - Please refer to Discharge Medication List for details on any medication dosing tapers (if applicable to patient)  · Discharge Medication List: See after visit summary for reconciled discharge medications  Diet restrictions:         Diet Orders   (From admission, onward)             Start     Ordered    03/27/23 2349  Diet Regular; Regular House  Diet effective now        References:    Nutrtion Support Algorithm Enteral vs  Parenteral   Question Answer Comment   Diet Type Regular    Regular Regular House    RD to adjust diet per protocol?  Yes        03/27/23 3795              Activity restrictions: No strenuous activity  Discharge Condition: fair    Outpatient Follow-Up and Discharge Instructions  See after visit summary section titled Discharge Instructions for information provided to patient and family  Code Status: Level 3 - DNAR and DNI  Discharge Statement   I spent 35 minutes discharging the patient  This time was spent on the day of discharge  Greater than 50% of total time was spent with the patient and / or family counseling and / or coordination of care  ** Please Note: This note was completed in part utilizing [employer] Flu[employer] Direct Software  Grammatical errors, random word insertions, spelling mistakes, and incomplete sentences may be an occasional consequence of this system secondary to software limitations, ambient noise, and hardware issues  If you have any questions or concerns about the content, text, or information contained within the body of this dictation, please contact the provider for clarification  **

## 2023-03-30 NOTE — NURSING NOTE
Patient had IV removed  Instructions reviewed  He was escorted by transport to [facility]  He had security bring him his belongings

## 2023-03-30 NOTE — ASSESSMENT & PLAN NOTE
Patient with accelerated hypertension present on admission  This likely contributed to patient's strokelike symptoms    Close monitoring as an outpatient and with VNA

## 2023-03-30 NOTE — PLAN OF CARE
Problem: PHYSICAL THERAPY ADULT  Goal: Performs mobility at highest level of function for planned discharge setting  See evaluation for individualized goals  Description: Treatment/Interventions: Functional transfer training, LE strengthening/ROM, Therapeutic exercise, Endurance training, Patient/family training, Equipment eval/education, Bed mobility, Gait training, Compensatory technique education, Continued evaluation, Spoke to nursing, OT  Equipment Recommended: Sean Carlson       See flowsheet documentation for full assessment, interventions and recommendations  Outcome: Completed  Note: Prognosis: Good  Problem List: Decreased strength, Decreased endurance, Impaired balance, Decreased mobility, Impaired judgement, Decreased safety awareness  Assessment: Seen for progression of PT  Tolerated session with pacing  Cues for hand placement with transitions  Able to ambulate increased distances with RW support and S  No LOB with use of RW  Standing rests needed  Cues when performing ex needed for quality of exercise  Cont  rec use of RW and HHPT at d/c  The patient's AM-PAC Basic Mobility Inpatient Short Form Raw Score is 23  A Raw score of greater than 16 suggests the patient may benefit from discharge to home  Please also refer to the recommendation of the Physical Therapist for safe discharge planning  HHPT and RW for home  Will d/c PT to restorative program         PT Discharge Recommendation: Home with home health rehabilitation    See flowsheet documentation for full assessment

## 2023-03-31 LAB — BACTERIA UR CULT: ABNORMAL

## 2023-03-31 NOTE — TELEPHONE ENCOUNTER
Returned call to patient  Appt for 4/10/23 with Dr Ascencion Hinojosa for fu , due to appt availability and recommended fu with CRNP  Office will continue to monitor UC finalization

## 2023-04-01 LAB — VIT B1 BLD-SCNC: 118.2 NMOL/L (ref 66.5–200)

## 2023-04-03 RX ORDER — SULFAMETHOXAZOLE AND TRIMETHOPRIM 800; 160 MG/1; MG/1
1 TABLET ORAL 2 TIMES DAILY
Qty: 6 TABLET | Refills: 0 | Status: SHIPPED | OUTPATIENT
Start: 2023-04-03 | End: 2023-04-06

## 2023-04-03 NOTE — TELEPHONE ENCOUNTER
Can stop the cipro from hospitalist and take 3 more days of bactrim  Sent to [employer]  Sees Sheyla Black next week, he may or may not want to scope pt at visit   Thanks!!!

## 2023-04-20 ENCOUNTER — APPOINTMENT (OUTPATIENT)
Dept: LAB | Facility: MEDICAL CENTER | Age: 88
End: 2023-04-20

## 2023-04-20 DIAGNOSIS — Z12.5 PROSTATE CANCER SCREENING: ICD-10-CM

## 2023-04-20 LAB — PSA SERPL-MCNC: 1.1 NG/ML (ref 0–4)

## 2023-04-28 ENCOUNTER — TELEPHONE (OUTPATIENT)
Dept: UROLOGY | Facility: AMBULATORY SURGERY CENTER | Age: 88
End: 2023-04-28

## 2023-04-28 NOTE — TELEPHONE ENCOUNTER
Spoke to patient and gave normal US results per Dr Cesilia Loyola  Patient to follow up in October with PEDRO

## 2023-10-13 ENCOUNTER — OFFICE VISIT (OUTPATIENT)
Dept: UROLOGY | Facility: MEDICAL CENTER | Age: 88
End: 2023-10-13
Payer: MEDICARE

## 2023-10-13 VITALS
HEIGHT: 70 IN | BODY MASS INDEX: 25.91 KG/M2 | WEIGHT: 181 LBS | OXYGEN SATURATION: 94 % | DIASTOLIC BLOOD PRESSURE: 70 MMHG | HEART RATE: 84 BPM | SYSTOLIC BLOOD PRESSURE: 140 MMHG

## 2023-10-13 DIAGNOSIS — Z12.5 PROSTATE CANCER SCREENING: Primary | ICD-10-CM

## 2023-10-13 DIAGNOSIS — N40.1 BENIGN LOCALIZED HYPERPLASIA OF PROSTATE WITH URINARY OBSTRUCTION AND LOWER URINARY TRACT SYMPTOMS: ICD-10-CM

## 2023-10-13 DIAGNOSIS — N13.9 BENIGN LOCALIZED HYPERPLASIA OF PROSTATE WITH URINARY OBSTRUCTION AND LOWER URINARY TRACT SYMPTOMS: ICD-10-CM

## 2023-10-13 PROCEDURE — 99214 OFFICE O/P EST MOD 30 MIN: CPT | Performed by: NURSE PRACTITIONER

## 2023-10-13 RX ORDER — TAMSULOSIN HYDROCHLORIDE 0.4 MG/1
0.4 CAPSULE ORAL
Qty: 90 CAPSULE | Refills: 3 | Status: SHIPPED | OUTPATIENT
Start: 2023-10-13

## 2023-10-13 NOTE — PROGRESS NOTES
10/13/2023      Chief Complaint   Patient presents with    Benign Prostatic Hypertrophy     Assessment and Plan    81 y.o. male managed by our office. 1. Benign Prostatic Hyperplasia  Urine dip in office unremakable   Continue Tamsulosin - prescription refill provided   Will continue to monitor for worsening/progression of symptoms  Follow up in the office in 2 years     2. Prostate Cancer Screening  PSA performed 4/20/2023 resulted 1.1  KYLE- prostate 45-50 g with no nodules   Repeat PSA and KYLE in 2 year    History of Present Illness  Misty Sparks is a 81 y.o. male here for follow up evaluation of  urinary symptoms secondary benign prostatic hyperplasia. Patient has been previously managed with tamsulosin with good control all lower urinary tract symptoms. On evaluation the office today he denies complaints of urinary frequency and urgency. Reports getting up 1-2 times at night to urinate. Reports sensation of complete bladder emptying with urination. He denies changes to his general health since prior office evaluation. PSA trend below. Component      Latest Ref Rng 4/20/2023   PSA, Total      0.0 - 4.0 ng/mL 1.1        Component 02/24/2022 03/10/2020 04/23/2018 03/27/2017 03/24/2016 12/14/2015 03/13/2014 12/31/2012 01/03/2012               PSA, Total 1.28 1.80 2.59 1.44 2.09 3.38 1.17 1.29 1.06   PSA, Free -- -- -- --            Review of Systems   Constitutional:  Negative for chills and fever. Respiratory:  Negative for cough and shortness of breath. Cardiovascular:  Negative for chest pain. Gastrointestinal:  Negative for abdominal distention, abdominal pain, blood in stool, nausea and vomiting. Genitourinary:  Negative for difficulty urinating, dysuria, enuresis, flank pain, frequency, hematuria and urgency. Skin:  Negative for rash.            AUA SYMPTOM SCORE      Flowsheet Row Most Recent Value   AUA SYMPTOM SCORE    How often have you had a sensation of not emptying your bladder completely after you finished urinating? 1   How often have you had to urinate again less than two hours after you finished urinating? 1   How often have you found you stopped and started again several times when you urinate? 0   How often have you found it difficult to postpone urination? 0   How often have you had a weak urinary stream? 0   How often have you had to push or strain to begin urination? 0   How many times did you most typically get up to urinate from the time you went to bed at night until the time you got up in the morning? 0   Quality of Life: If you were to spend the rest of your life with your urinary condition just the way it is now, how would you feel about that? 1   AUA SYMPTOM SCORE 2               Past Medical History  Past Medical History:   Diagnosis Date    Allergic rhinitis     BPH with obstruction/lower urinary tract symptoms 2016    Eye disorder     Frequency of urination 2015    Gout     H/O gastroesophageal reflux (GERD)     Hypercholesteremia     Hypertension     Incomplete bladder emptying 2016    UTI (urinary tract infection) 2015       Past Social History  Past Surgical History:   Procedure Laterality Date    CATARACT EXTRACTION, BILATERAL      COLONOSCOPY  2002    TONSILLECTOMY  1942     Social History     Tobacco Use   Smoking Status Never   Smokeless Tobacco Never       Past Family History  Family History   Problem Relation Age of Onset    Heart failure Mother     Heart attack Father     Hypertension Father        Past Social history  Social History     Socioeconomic History    Marital status: Single     Spouse name: Not on file    Number of children: Not on file    Years of education: Not on file    Highest education level: Not on file   Occupational History    Not on file   Tobacco Use    Smoking status: Never    Smokeless tobacco: Never   Substance and Sexual Activity    Alcohol use:  Yes     Alcohol/week: 2.0 standard drinks of alcohol     Types: 2 Glasses of wine per week    Drug use: No    Sexual activity: Not on file   Other Topics Concern    Not on file   Social History Narrative    Not on file     Social Determinants of Health     Financial Resource Strain: Not on file   Food Insecurity: Not on file   Transportation Needs: Not on file   Physical Activity: Not on file   Stress: Not on file   Social Connections: Not on file   Intimate Partner Violence: Not on file   Housing Stability: Not on file       Current Medications  Current Outpatient Medications   Medication Sig Dispense Refill    aspirin (ECOTRIN LOW STRENGTH) 81 mg EC tablet Take 1 tablet (81 mg total) by mouth daily Enteric coated 30 tablet 0    Cholecalciferol 25 MCG (1000 UT) capsule Take by mouth      cyanocobalamin (VITAMIN B-12) 1000 MCG tablet Take 1 tablet (1,000 mcg total) by mouth daily Do not start before March 31, 2023. 30 tablet 0    famotidine (PEPCID) 40 MG tablet Take 40 mg by mouth daily at bedtime      furosemide (LASIX) 40 mg tablet Take 40 mg by mouth      loratadine (CLARITIN) 10 mg tablet Take 10 mg by mouth daily      losartan (COZAAR) 100 MG tablet Take 100 mg by mouth daily      potassium chloride (K-DUR,KLOR-CON) 20 mEq tablet Take 20 mEq by mouth      simvastatin (ZOCOR) 40 mg tablet Take 40 mg by mouth      tamsulosin (FLOMAX) 0.4 mg Take 1 capsule (0.4 mg total) by mouth daily with dinner 90 capsule 3    budesonide (Pulmicort Flexhaler) 180 MCG/ACT inhaler Inhale 4 puffs 2 (two) times a day for 7 days Rinse mouth after use. (Patient not taking: Reported on 4/10/2023) 1 each 0    docusate sodium (COLACE) 100 mg capsule Take 1 capsule (100 mg total) by mouth every 12 (twelve) hours 60 capsule 0     No current facility-administered medications for this visit. Allergies  Allergies   Allergen Reactions    Penicillins Other (See Comments)     Pt is unsure if allergic.          The following portions of the patient's history were reviewed and updated as appropriate: allergies, current medications, past medical history, past social history, past surgical history and problem list.      Vitals  Vitals:    10/13/23 1322   BP: 140/70   BP Location: Left arm   Patient Position: Sitting   Cuff Size: Large   Pulse: 84   SpO2: 94%   Weight: 82.1 kg (181 lb)   Height: 5' 10" (1.778 m)     Physical Exam  Physical Exam  Vitals reviewed. Constitutional:       General: He is not in acute distress. Appearance: Normal appearance. He is normal weight. HENT:      Head: Normocephalic. Cardiovascular:      Rate and Rhythm: Normal rate. Pulmonary:      Effort: No respiratory distress. Breath sounds: Normal breath sounds. Genitourinary:     Comments: KYLE- 50 g prostate with no nodules   Skin:     General: Skin is warm and dry. Neurological:      General: No focal deficit present. Mental Status: He is alert and oriented to person, place, and time. Comments: Mild expressive aphasia   Psychiatric:         Mood and Affect: Mood normal.         Behavior: Behavior normal.       Results  No results found for this or any previous visit (from the past 1 hour(s)).]  Lab Results   Component Value Date    PSA 1.1 04/20/2023     Lab Results   Component Value Date    CALCIUM 9.3 03/28/2023    K 3.7 03/28/2023    CO2 29 03/28/2023     03/28/2023    BUN 17 03/28/2023    CREATININE 1.15 03/28/2023     Lab Results   Component Value Date    WBC 7.89 03/28/2023    HGB 12.1 03/28/2023    HCT 37.2 03/28/2023    MCV 99 (H) 03/28/2023     03/28/2023       Orders  Orders Placed This Encounter   Procedures    PSA, Total Screen     This is a patient instruction: This test is non-fasting. Please drink two glasses of water morning of bloodwork.         Standing Status:   Future     Standing Expiration Date:   4/13/2025       YAMILA Fernandez

## 2024-05-29 ENCOUNTER — APPOINTMENT (EMERGENCY)
Dept: CT IMAGING | Facility: HOSPITAL | Age: 89
End: 2024-05-29
Payer: MEDICARE

## 2024-05-29 ENCOUNTER — APPOINTMENT (EMERGENCY)
Dept: RADIOLOGY | Facility: HOSPITAL | Age: 89
End: 2024-05-29
Payer: MEDICARE

## 2024-05-29 ENCOUNTER — HOSPITAL ENCOUNTER (EMERGENCY)
Facility: HOSPITAL | Age: 89
Discharge: HOME | End: 2024-05-30
Attending: EMERGENCY MEDICINE
Payer: MEDICARE

## 2024-05-29 DIAGNOSIS — S09.90XA INJURY OF HEAD, INITIAL ENCOUNTER: ICD-10-CM

## 2024-05-29 DIAGNOSIS — M25.511 RIGHT SHOULDER PAIN: ICD-10-CM
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
## 2024-05-29 PROCEDURE — 99284 EMERGENCY DEPT VISIT MOD MDM: CPT

## 2024-05-29 PROCEDURE — 73030 X-RAY EXAM OF SHOULDER: CPT

## 2024-05-29 PROCEDURE — 70450 CT HEAD/BRAIN W/O DYE: CPT

## 2024-05-29 PROCEDURE — 72125 CT NECK SPINE W/O DYE: CPT

## 2024-05-29 PROCEDURE — 99284 EMERGENCY DEPT VISIT MOD MDM: CPT | Performed by: PHYSICIAN ASSISTANT

## 2024-05-29 PROCEDURE — 71046 X-RAY EXAM CHEST 2 VIEWS: CPT

## 2024-05-29 RX ORDER — ACETAMINOPHEN 325 MG/1
650 TABLET ORAL ONCE
Status: COMPLETED | OUTPATIENT
Start: 2024-05-29 | End: 2024-05-29

## 2024-05-29 RX ADMIN — ACETAMINOPHEN 650 MG: 325 TABLET, FILM COATED ORAL at 21:57

## 2024-05-30 VITALS
OXYGEN SATURATION: 95 % | HEART RATE: 69 BPM | WEIGHT: 182.98 LBS | SYSTOLIC BLOOD PRESSURE: 161 MMHG | RESPIRATION RATE: 16 BRPM | DIASTOLIC BLOOD PRESSURE: 71 MMHG | BODY MASS INDEX: 26.26 KG/M2 | TEMPERATURE: 98.3 F

## 2024-05-30 NOTE — ED PROVIDER NOTES
History  Chief Complaint   Patient presents with    Fall     Pt tripped and fell while coming out of the elevator. + head strike, -thinner/LOC. Pt is having right shoulder pain     Sen Petersen Jr is a 89 y.o. male with no significant past medical history presenting to the ER complaining of head injury which occurred when he tripped while walking out of the elevator in his building.  Patient reports that his walker got stuck the uneven ground and he slipped and fell back hitting the back of his head on the ground.  He reports that he also hit his right shoulder and has been having mild right shoulder pain.  He reports that the pain has been improving without any treatment just while sitting in the ER.  He denies any LOC.  Reports takes 1 baby aspirin a day.  Denies any thinners.  Denies any visual changes, headache, weakness, confusion, numbness, tingling, chest pain, shortness of breath, or facial droop.        Prior to Admission Medications   Prescriptions Last Dose Informant Patient Reported? Taking?   Cholecalciferol 25 MCG (1000 UT) capsule  Self Yes No   Sig: Take by mouth   aspirin (ECOTRIN LOW STRENGTH) 81 mg EC tablet   No No   Sig: Take 1 tablet (81 mg total) by mouth daily Enteric coated   budesonide (Pulmicort Flexhaler) 180 MCG/ACT inhaler   No No   Sig: Inhale 4 puffs 2 (two) times a day for 7 days Rinse mouth after use.   Patient not taking: Reported on 4/10/2023   cyanocobalamin (VITAMIN B-12) 1000 MCG tablet   No No   Sig: Take 1 tablet (1,000 mcg total) by mouth daily Do not start before March 31, 2023.   docusate sodium (COLACE) 100 mg capsule   No No   Sig: Take 1 capsule (100 mg total) by mouth every 12 (twelve) hours   famotidine (PEPCID) 40 MG tablet  Self Yes No   Sig: Take 40 mg by mouth daily at bedtime   furosemide (LASIX) 40 mg tablet  Self Yes No   Sig: Take 40 mg by mouth   loratadine (CLARITIN) 10 mg tablet   Yes No   Sig: Take 10 mg by mouth daily   losartan (COZAAR) 100 MG tablet   Self Yes No   Sig: Take 100 mg by mouth daily   potassium chloride (K-DUR,KLOR-CON) 20 mEq tablet  Self Yes No   Sig: Take 20 mEq by mouth   simvastatin (ZOCOR) 40 mg tablet  Self Yes No   Sig: Take 40 mg by mouth   tamsulosin (FLOMAX) 0.4 mg   No No   Sig: Take 1 capsule (0.4 mg total) by mouth daily with dinner      Facility-Administered Medications: None       Past Medical History:   Diagnosis Date    Allergic rhinitis     BPH with obstruction/lower urinary tract symptoms 2016    Eye disorder     Frequency of urination 2015    Gout     H/O gastroesophageal reflux (GERD)     Hypercholesteremia     Hypertension     Incomplete bladder emptying 2016    UTI (urinary tract infection) 2015       Past Surgical History:   Procedure Laterality Date    CATARACT EXTRACTION, BILATERAL      COLONOSCOPY  2002    TONSILLECTOMY  1942       Family History   Problem Relation Age of Onset    Heart failure Mother     Heart attack Father     Hypertension Father      I have reviewed and agree with the history as documented.    E-Cigarette/Vaping     E-Cigarette/Vaping Substances     Social History     Tobacco Use    Smoking status: Never    Smokeless tobacco: Never   Substance Use Topics    Alcohol use: Yes     Alcohol/week: 2.0 standard drinks of alcohol     Types: 2 Glasses of wine per week    Drug use: No       Review of Systems   Constitutional:  Negative for chills and fever.   HENT:  Negative for ear pain and sore throat.    Eyes:  Negative for pain and visual disturbance.   Respiratory:  Negative for cough and shortness of breath.    Cardiovascular:  Negative for chest pain and palpitations.   Gastrointestinal:  Negative for abdominal pain and vomiting.   Genitourinary:  Negative for dysuria and hematuria.   Musculoskeletal:  Negative for arthralgias and back pain.   Skin:  Negative for color change and rash.   Neurological:  Negative for seizures and syncope.   All other systems reviewed and are negative.      Physical  Exam  Physical Exam  Vitals and nursing note reviewed.   Constitutional:       General: He is not in acute distress.     Appearance: He is well-developed. He is not ill-appearing, toxic-appearing or diaphoretic.   HENT:      Head: Normocephalic and atraumatic.   Eyes:      Conjunctiva/sclera: Conjunctivae normal.   Cardiovascular:      Rate and Rhythm: Normal rate and regular rhythm.      Heart sounds: No murmur heard.  Pulmonary:      Effort: Pulmonary effort is normal. No respiratory distress.      Breath sounds: Normal breath sounds.   Abdominal:      Palpations: Abdomen is soft.      Tenderness: There is no abdominal tenderness.   Musculoskeletal:         General: No swelling.      Cervical back: Full passive range of motion without pain, normal range of motion and neck supple.      Comments: No swelling, tenderness to palpation, or decreased range of motion of right shoulder or bilateral upper or lower extremities.  No tenderness to palpation at C-spine, T-spine, or L-spine.  Full range of motion of neck.   Skin:     General: Skin is warm and dry.      Capillary Refill: Capillary refill takes less than 2 seconds.   Neurological:      Mental Status: He is alert.   Psychiatric:         Mood and Affect: Mood normal.         Vital Signs  ED Triage Vitals   Temperature Pulse Respirations Blood Pressure SpO2   05/29/24 1928 05/29/24 1928 05/29/24 1928 05/29/24 1928 05/29/24 1928   98.3 °F (36.8 °C) 82 18 (!) 197/83 97 %      Temp Source Heart Rate Source Patient Position - Orthostatic VS BP Location FiO2 (%)   05/29/24 1928 05/29/24 1928 05/29/24 1928 05/29/24 1928 --   Oral Monitor Lying Right arm       Pain Score       05/29/24 2157       2           Vitals:    05/29/24 1928 05/29/24 2000 05/29/24 2202   BP: (!) 197/83 156/69 148/67   Pulse: 82 83 86   Patient Position - Orthostatic VS: Lying Lying Lying         Visual Acuity  Visual Acuity      Flowsheet Row Most Recent Value   L Pupil Size (mm) 2   R Pupil Size  (mm) 2            ED Medications  Medications   acetaminophen (TYLENOL) tablet 650 mg (650 mg Oral Given 5/29/24 2157)       Diagnostic Studies  Results Reviewed       None                   CT head without contrast   Final Result by Cheko Begum MD (05/29 2144)      No acute intracranial abnormality.      Mild left occipital scalp swelling without underlying fracture.            Workstation performed: [id number]         CT cervical spine without contrast   Final Result by Cheko Begum MD (05/29 2153)      No cervical spine fracture or traumatic malalignment.                  Workstation performed: [id number]         XR shoulder 2+ views RIGHT   ED Interpretation by Lisa Olivo PA-C (05/29 2129)   No acute osseous abnormality or dislocation when interpreted by me.      XR chest 2 views   ED Interpretation by Lisa Olivo PA-C (05/29 2129)   No acute cardiopulmonary abnormalities and interpreted by me or osseous abnormalities when interpreted by me.                 Procedures  Procedures         ED Course                               SBIRT 20yo+      Flowsheet Row Most Recent Value   Initial Alcohol Screen: US AUDIT-C     1. How often do you have a drink containing alcohol? 0 Filed at: 05/29/2024 1931   2. How many drinks containing alcohol do you have on a typical day you are drinking?  0 Filed at: 05/29/2024 1931   3a. Male UNDER 65: How often do you have five or more drinks on one occasion? 0 Filed at: 05/29/2024 1931   3b. FEMALE Any Age, or MALE 65+: How often do you have 4 or more drinks on one occassion? 0 Filed at: 05/29/2024 1931   Audit-C Score 0 Filed at: 05/29/2024 1931   [place]: How many times in the past year have you...    Used an illegal drug or used a prescription medication for non-medical reasons? Never Filed at: 05/29/2024 1931                      Medical Decision Making  Sen Petersen  is a 89 y.o. male with no significant past medical history presenting to the ER complaining of  head injury which occurred when he tripped while walking out of the elevator in his building.  No LOC or thinners.  On exam patient is well-appearing in no acute stress.  Vital signs within normal limits.  Physical examination reveals no tenderness to palpation of bilateral upper or lower extremities and no tenderness to palpation of the head.  No significant signs of injury.  Discussed with patient that considering he did hit his right shoulder we will get a chest x-ray and shoulder x-ray to rule out any fractures.  Will also check CT of head and neck to rule any acute intracranial abnormalities or neck trauma.  Patient is understanding and agreeable with plan.    CT of head reveals mild left occipital scalp swelling without any underlying fracture.  CT of C-spine reveals no fracture or traumatic alignment.  Chest x-ray as well as x-ray of right shoulder reviewed by me and reveals no acute osseous abnormalities.  I discussed patient that x-rays will be double checked by radiologist and if there is a fracture that I missed he will be receiving a phone call.  Patient is currently denying any headache or shoulder pain.  Advised very close follow-up with PCP.  Discussed fall prevention.  Advised strict return precautions there.  Patient is understanding and agreeable with plan.    Problems Addressed:  Fall, initial encounter: acute illness or injury  Injury of head, initial encounter: acute illness or injury  Right shoulder pain: acute illness or injury    Amount and/or Complexity of Data Reviewed  Radiology: ordered and independent interpretation performed.    Risk  OTC drugs.             Disposition  Final diagnoses:   Fall, initial encounter   Injury of head, initial encounter   Right shoulder pain     Time reflects when diagnosis was documented in both MDM as applicable and the Disposition within this note       Time User Action Codes Description Comment    5/29/2024 11:18 PM Lisa Olivo Add [W19.XXXA] Fall,  initial encounter     5/29/2024 11:19 PM Lisa Olivo Add [S09.90XA] Injury of head, initial encounter     5/29/2024 11:19 PM Lisa Olivo Add [M25.511] Right shoulder pain           ED Disposition       ED Disposition   Discharge    Condition   Stable    Date/Time   Wed May 29, 2024 2319    Comment   Sen Petersen Jr discharge to home/self care.                   Follow-up Information       Follow up With Specialties Details Why Contact Info Additional Information    Veronica Ruelas DO  Schedule an appointment as soon as possible for a visit in 1 day  [address] [place] [address]  [address]  [facility] [address]  [phone]       [facility] Emergency Medicine  If symptoms worsen [address] [place] [id number] [phone] [facility], [address], [place], [address]            Patient's Medications   Discharge Prescriptions    No medications on file       No discharge procedures on file.    PDMP Review       None            ED Provider  Electronically Signed by             Lisa Olivo PA-C  05/29/24 1596

## 2024-05-30 NOTE — ED NOTES
Pt requesting to be OOB. Recliner brought to room for pt to sit in. Turkey sandwich and ginger ale provided.     Tamia Arana  05/30/24 0107

## 2024-08-26 ENCOUNTER — APPOINTMENT (EMERGENCY)
Dept: CT IMAGING | Facility: HOSPITAL | Age: 89
End: 2024-08-26
Payer: MEDICARE

## 2024-08-26 ENCOUNTER — HOSPITAL ENCOUNTER (EMERGENCY)
Facility: HOSPITAL | Age: 89
Discharge: HOME | End: 2024-08-26
Attending: EMERGENCY MEDICINE
Payer: MEDICARE

## 2024-08-26 ENCOUNTER — APPOINTMENT (EMERGENCY)
Dept: RADIOLOGY | Facility: HOSPITAL | Age: 89
End: 2024-08-26
Payer: MEDICARE

## 2024-08-26 VITALS
BODY MASS INDEX: 26.92 KG/M2 | TEMPERATURE: 98.9 F | SYSTOLIC BLOOD PRESSURE: 182 MMHG | RESPIRATION RATE: 16 BRPM | DIASTOLIC BLOOD PRESSURE: 82 MMHG | HEART RATE: 77 BPM | OXYGEN SATURATION: 98 % | WEIGHT: 187.61 LBS
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
## 2024-08-26 DIAGNOSIS — G93.89 CEREBRAL VENTRICULOMEGALY: ICD-10-CM

## 2024-08-26 PROCEDURE — 72125 CT NECK SPINE W/O DYE: CPT

## 2024-08-26 PROCEDURE — 70450 CT HEAD/BRAIN W/O DYE: CPT

## 2024-08-26 PROCEDURE — 71045 X-RAY EXAM CHEST 1 VIEW: CPT

## 2024-08-26 PROCEDURE — 99285 EMERGENCY DEPT VISIT HI MDM: CPT | Performed by: EMERGENCY MEDICINE

## 2024-08-26 PROCEDURE — 99284 EMERGENCY DEPT VISIT MOD MDM: CPT

## 2024-08-26 NOTE — ED PROVIDER NOTES
"History  Chief Complaint   Patient presents with    Fall     [place] [facility] [place], walking with hands full, not steady on feet and fell, takes 81 mg of aspirin, + head strike,  called for lift assist but found pt too weak,      This is an 89-year-old male with a medical history significant for BPH, strokelike symptoms, ambulatory dysfunction who presents to the ED via EMS status post fall.  [facility]  He reports that while carrying multiple groceries he sustained a mechanical fall.  States \"I was in a rush and was carrying too many groceries which caused me to fall.\" Reports he fell backwards from a standing position.  Does report head strike, denies loss of consciousness, is on 81 mg aspirin daily.  Patient reports he did have difficulty getting up on his own, fire department did come to the facility for lift assist.  Patient reports this is his baseline, he reports he typically does need assistance getting up from the ground, states he does not feel weaker today than his baseline.  At time of ED evaluation he denies any acute concerns or complaints.  He denies any headaches, dizziness, lightheadedness, vision changes, neck pain, back pain, chest pain, SOB, abdominal pain, nausea, vomiting.  Denies any pain or discomfort in any extremities, denies any open wounds.        Prior to Admission Medications   Prescriptions Last Dose Informant Patient Reported? Taking?   Cholecalciferol 25 MCG (1000 UT) capsule  Self Yes No   Sig: Take by mouth   aspirin (ECOTRIN LOW STRENGTH) 81 mg EC tablet   No No   Sig: Take 1 tablet (81 mg total) by mouth daily Enteric coated   budesonide (Pulmicort Flexhaler) 180 MCG/ACT inhaler   No No   Sig: Inhale 4 puffs 2 (two) times a day for 7 days Rinse mouth after use.   Patient not taking: Reported on 4/10/2023   cyanocobalamin (VITAMIN B-12) 1000 MCG tablet   No No   Sig: Take 1 tablet (1,000 mcg total) by mouth " daily Do not start before March 31, 2023.   docusate sodium (COLACE) 100 mg capsule   No No   Sig: Take 1 capsule (100 mg total) by mouth every 12 (twelve) hours   famotidine (PEPCID) 40 MG tablet  Self Yes No   Sig: Take 40 mg by mouth daily at bedtime   furosemide (LASIX) 40 mg tablet  Self Yes No   Sig: Take 40 mg by mouth   loratadine (CLARITIN) 10 mg tablet   Yes No   Sig: Take 10 mg by mouth daily   losartan (COZAAR) 100 MG tablet  Self Yes No   Sig: Take 100 mg by mouth daily   potassium chloride (K-DUR,KLOR-CON) 20 mEq tablet  Self Yes No   Sig: Take 20 mEq by mouth   simvastatin (ZOCOR) 40 mg tablet  Self Yes No   Sig: Take 40 mg by mouth   tamsulosin (FLOMAX) 0.4 mg   No No   Sig: Take 1 capsule (0.4 mg total) by mouth daily with dinner      Facility-Administered Medications: None       Past Medical History:   Diagnosis Date    Allergic rhinitis     BPH with obstruction/lower urinary tract symptoms 2016    Eye disorder     Frequency of urination 2015    Gout     H/O gastroesophageal reflux (GERD)     Hypercholesteremia     Hypertension     Incomplete bladder emptying 2016    UTI (urinary tract infection) 2015       Past Surgical History:   Procedure Laterality Date    CATARACT EXTRACTION, BILATERAL      COLONOSCOPY  2002    TONSILLECTOMY  1942       Family History   Problem Relation Age of Onset    Heart failure Mother     Heart attack Father     Hypertension Father      I have reviewed and agree with the history as documented.    E-Cigarette/Vaping     E-Cigarette/Vaping Substances     Social History     Tobacco Use    Smoking status: Never    Smokeless tobacco: Never   Substance Use Topics    Alcohol use: Yes     Alcohol/week: 2.0 standard drinks of alcohol     Types: 2 Glasses of wine per week    Drug use: No       Review of Systems   Constitutional: Negative.    HENT: Negative.     Eyes:  Negative for photophobia and visual disturbance.   Respiratory:  Negative for cough and shortness of breath.     Cardiovascular:  Negative for chest pain and palpitations.   Gastrointestinal:  Negative for abdominal pain, diarrhea, nausea and vomiting.   Genitourinary:  Negative for difficulty urinating, dysuria, flank pain and hematuria.   Musculoskeletal:  Negative for arthralgias, back pain, neck pain and neck stiffness.   Skin:  Negative for wound.   Neurological:  Positive for weakness (Reports generalized weakness at baseline, no acute changes). Negative for dizziness, seizures, syncope, light-headedness, numbness and headaches.       Physical Exam  Physical Exam  Vitals and nursing note reviewed.   Constitutional:       General: He is not in acute distress.     Appearance: Normal appearance. He is well-developed. He is not ill-appearing, toxic-appearing or diaphoretic.   HENT:      Head: Normocephalic and atraumatic. No Olson's sign, right periorbital erythema or left periorbital erythema.      Jaw: There is normal jaw occlusion. No trismus.      Right Ear: Tympanic membrane, ear canal and external ear normal.      Left Ear: Tympanic membrane, ear canal and external ear normal.      Nose:      Right Nostril: No epistaxis or septal hematoma.      Left Nostril: No epistaxis or septal hematoma.      Mouth/Throat:      Pharynx: Oropharynx is clear.   Eyes:      Extraocular Movements: Extraocular movements intact.      Conjunctiva/sclera: Conjunctivae normal.      Pupils: Pupils are equal, round, and reactive to light.   Cardiovascular:      Rate and Rhythm: Normal rate and regular rhythm.      Heart sounds: No murmur heard.  Pulmonary:      Effort: Pulmonary effort is normal. No respiratory distress.      Breath sounds: Normal breath sounds.   Abdominal:      General: Abdomen is flat.      Palpations: Abdomen is soft.      Tenderness: There is no abdominal tenderness. There is no right CVA tenderness or left CVA tenderness.      Comments: Abdomen diffusely nontender on light and deep palpation.  No signs of pelvic  tenderness or pelvic instability on exam.   Musculoskeletal:         General: No swelling.      Right shoulder: No swelling, deformity or tenderness. Normal range of motion.      Left shoulder: No swelling, deformity or tenderness. Normal range of motion.      Right upper arm: No swelling, deformity or tenderness.      Left upper arm: No swelling, deformity or tenderness.      Right elbow: No swelling or deformity. Normal range of motion. No tenderness.      Left elbow: No swelling or deformity. Normal range of motion. No tenderness.      Right forearm: No swelling, deformity or tenderness.      Left forearm: No swelling, deformity or tenderness.      Right wrist: No swelling, deformity or tenderness. Normal range of motion. Normal pulse.      Left wrist: No swelling, deformity or tenderness. Normal range of motion. Normal pulse.      Right hand: No swelling, deformity or tenderness. Normal range of motion. Normal capillary refill. Normal pulse.      Left hand: No swelling, deformity or tenderness. Normal range of motion. Normal capillary refill. Normal pulse.      Cervical back: Normal range of motion and neck supple. No rigidity.      Right hip: No deformity or tenderness. Normal range of motion.      Left hip: No deformity or tenderness. Normal range of motion.      Right upper leg: No swelling, deformity or tenderness.      Left upper leg: No swelling, deformity or tenderness.      Right lower leg: No swelling, deformity or tenderness. No edema.      Left lower leg: No swelling, deformity or tenderness. No edema.      Right ankle: No swelling or deformity. No tenderness. Normal range of motion.      Right Achilles Tendon: No tenderness or defects.      Left ankle: No swelling or deformity. No tenderness. Normal range of motion.      Left Achilles Tendon: No tenderness or defects.      Right foot: Normal capillary refill. No swelling, deformity or tenderness. Normal pulse.      Left foot: Normal capillary refill.  No swelling, deformity or tenderness. Normal pulse.      Comments: Examination of patient's cervical, thoracic, lumbar spine shows no signs of midline tenderness or step-off deformities.  Patient has normal range of motion in neck without increased pain.    Normal range of motion in bilateral upper and lower extremities with proximal and distal sensation intact.  Radial and ulnar pulses 2+ bilaterally.  Normal dorsal pedal pulses and posterior tibial pulses.   Skin:     General: Skin is warm and dry.      Capillary Refill: Capillary refill takes less than 2 seconds.   Neurological:      General: No focal deficit present.      Mental Status: He is alert and oriented to person, place, and time.   Psychiatric:         Mood and Affect: Mood normal.         Vital Signs  ED Triage Vitals [08/26/24 1631]   Temperature Pulse Respirations Blood Pressure SpO2   98.9 °F (37.2 °C) 82 16 (!) 176/77 97 %      Temp Source Heart Rate Source Patient Position - Orthostatic VS BP Location FiO2 (%)   Oral Monitor Sitting Left arm --      Pain Score       No Pain           Vitals:    08/26/24 1631 08/26/24 1856   BP: (!) 176/77 (!) 182/82   Pulse: 82 77   Patient Position - Orthostatic VS: Sitting Sitting         Visual Acuity      ED Medications  Medications - No data to display    Diagnostic Studies  Results Reviewed       None                   CT head without contrast   Final Result by Dajuan Avila MD (08/26 1905)      Stable exam without acute intracranial abnormality.   Unchanged ventriculomegaly which is slightly out of proportion to degree of volume loss. Correlate for NPH.               Workstation performed: [id number]         CT cervical spine without contrast   Final Result by Dajuan Avila MD (08/26 1909)      No cervical spine fracture or traumatic malalignment.                  Workstation performed: [id number]         XR chest 1 view portable   ED Interpretation by Venkat Goldman PA-C (08/26 4267)   ED  Interpretation: No acute cardiopulmonary disease.                 Procedures  Procedures         ED Course  ED Course as of 08/26/24 2248   Mon Aug 26, 2024   1925 Patient was able to ambulate to and from bathroom in ED with steady gait.                                               Medical Decision Making  89-year-old male presents to the ED status post fall with head strike, is on 81 mg aspirin.  Patient denies any pain or discomfort at time of ED evaluation.  CT cervical spine showed no acute fracture or malalignment.  CT head showed no acute abnormalities though did show ventriculomegaly.  Per chart review, this is unchanged from prior CT studies.  Patient ambulatory in ED with steady gait.  Patient made aware of incidental CT head finding with ventriculomegaly.  Ambulatory referral to neurology for further evaluation and management placed.  Patient also advised PCP follow-up for further evaluation and management.  ED return precautions discussed with patient.  Patient verbalized understanding and agreement with plan.    Amount and/or Complexity of Data Reviewed  Radiology: ordered and independent interpretation performed.                 Disposition  Final diagnoses:   Fall, initial encounter   Cerebral ventriculomegaly     Time reflects when diagnosis was documented in both MDM as applicable and the Disposition within this note       Time User Action Codes Description Comment    8/26/2024  7:34 PM Venkat Goldman Add [W19.XXXA] Fall, initial encounter     8/26/2024  7:34 PM Venkat Goldman Add [G93.89] Cerebral ventriculomegaly           ED Disposition       ED Disposition   Discharge    Condition   Stable    Date/Time   Mon Aug 26, 2024  7:36 PM    Comment   Sen Petersen Jr discharge to home/self care.                   Follow-up Information       Follow up With Specialties Details Why Contact Info Additional Information    Veronica Ruelas DO  Schedule an appointment as soon as possible for a visit  For re-check  [address] [facility] [address]  [facility] [address] [phone]       [facility] Neurology [facility] Neurology Schedule an appointment as soon as possible for a visit  For re-check [address]  [address] [facility] [address] [phone] [facility], [address] [place], [place], [address] [phone]            Discharge Medication List as of 8/26/2024  7:36 PM        CONTINUE these medications which have NOT CHANGED    Details   aspirin (ECOTRIN LOW STRENGTH) 81 mg EC tablet Take 1 tablet (81 mg total) by mouth daily Enteric coated, Starting Thu 3/30/2023, Until Fri 10/13/2023, Normal      budesonide (Pulmicort Flexhaler) 180 MCG/ACT inhaler Inhale 4 puffs 2 (two) times a day for 7 days Rinse mouth after use., Starting Sun 10/16/2022, Until Sun 10/23/2022, Normal      Cholecalciferol 25 MCG (1000 UT) capsule Take by mouth, Historical Med      cyanocobalamin (VITAMIN B-12) 1000 MCG tablet Take 1 tablet (1,000 mcg total) by mouth daily Do not start before March 31, 2023., Starting Fri 3/31/2023, Until Fri 10/13/2023, Normal      docusate sodium (COLACE) 100 mg capsule Take 1 capsule (100 mg total) by mouth every 12 (twelve) hours, Starting Tue 7/6/2021, Normal      famotidine (PEPCID) 40 MG tablet Take 40 mg by mouth daily at bedtime, Historical Med      furosemide (LASIX) 40 mg tablet Take 40 mg by mouth, Historical Med      loratadine (CLARITIN) 10 mg tablet Take 10 mg by mouth daily, Historical Med      losartan (COZAAR) 100 MG tablet Take 100 mg by mouth daily, Historical Med      potassium chloride (K-DUR,KLOR-CON) 20 mEq tablet Take 20 mEq by mouth, Historical Med      simvastatin (ZOCOR) 40 mg tablet Take 40 mg by mouth, Historical Med      tamsulosin (FLOMAX) 0.4 mg Take 1 capsule (0.4 mg total) by mouth daily with dinner, Starting Fri 10/13/2023, Normal                 PDMP Review       None            ED  Provider  Electronically Signed by             Venkat Goldman PA-C  08/26/24 8760

## 2024-08-26 NOTE — ED NOTES
Pt requesting a phone call be made to his sister, Tiara, advising her of pt's status and test results. RN informed pt that a phone call would be placed to Tiara once results of imaging were back.     Lashell Naylor RN  08/26/24 4192

## 2024-08-26 NOTE — ED NOTES
Per pt request, sister called to allow her to know that the pt is in the hospital. RN does not think the pt will stay the night but if he does we will call her and let her know.      Tiara Horan (Sister)  [phone] (Home Phone)        Sammie Vázquez RN  08/26/24 2079